# Patient Record
Sex: FEMALE | Race: WHITE | NOT HISPANIC OR LATINO | Employment: FULL TIME | URBAN - METROPOLITAN AREA
[De-identification: names, ages, dates, MRNs, and addresses within clinical notes are randomized per-mention and may not be internally consistent; named-entity substitution may affect disease eponyms.]

---

## 2018-03-29 ENCOUNTER — TRANSCRIBE ORDERS (OUTPATIENT)
Dept: OCCUPATIONAL THERAPY | Facility: CLINIC | Age: 48
End: 2018-03-29

## 2018-03-29 DIAGNOSIS — S66.911A MUSCLE STRAIN OF RIGHT WRIST, INITIAL ENCOUNTER: ICD-10-CM

## 2018-03-29 DIAGNOSIS — S46.811A STRAIN OF RIGHT DELTOID MUSCLE, INITIAL ENCOUNTER: Primary | ICD-10-CM

## 2018-04-03 ENCOUNTER — TREATMENT (OUTPATIENT)
Dept: PHYSICAL THERAPY | Facility: CLINIC | Age: 48
End: 2018-04-03

## 2018-04-03 DIAGNOSIS — S66.911D STRAIN OF RIGHT WRIST, SUBSEQUENT ENCOUNTER: ICD-10-CM

## 2018-04-03 DIAGNOSIS — S46.911D STRAIN OF RIGHT SHOULDER, SUBSEQUENT ENCOUNTER: Primary | ICD-10-CM

## 2018-04-03 PROCEDURE — 97140 MANUAL THERAPY 1/> REGIONS: CPT | Performed by: PHYSICAL THERAPIST

## 2018-04-03 PROCEDURE — 97110 THERAPEUTIC EXERCISES: CPT | Performed by: PHYSICAL THERAPIST

## 2018-04-03 PROCEDURE — 97001 PR PHYS THERAPY EVALUATION: CPT | Performed by: PHYSICAL THERAPIST

## 2018-04-03 PROCEDURE — 97035 APP MDLTY 1+ULTRASOUND EA 15: CPT | Performed by: PHYSICAL THERAPIST

## 2018-04-03 PROCEDURE — 97033 APP MDLTY 1+IONTPHRSIS EA 15: CPT | Performed by: PHYSICAL THERAPIST

## 2018-04-03 NOTE — PROGRESS NOTES
Physical Therapy Initial Evaluation and Plan of Care    Patient: Jaz Mora   : 1970  Diagnosis/ICD-10 Code:  Strain of right shoulder, subsequent encounter [S46.911D]  Referring practitioner: RAFFI Gonsales    Subjective Evaluation    History of Present Illness  Date of onset: 3/22/2018  Mechanism of injury: Slipped on ice in the parking lot, reached out the right arm to catch self and landed with all weight on right UE/on her belly -Immediate pain in the right arm, could not  Extend arm to reach for her phone  BW same day - xrayd wrist - negative - Ibu 800, wrist wrap, ice,   Return to BW - now on diclofenac    No specific outside activities       Patient Occupation:  [employer] - one month    Precautions and Work Restrictions: mo heavy lifting no over head workPain  Current pain ratin  At best pain ratin  At worst pain ratin  Location: abduction, biceps, triceps, wrist radial aspect distal radius, wrist feels weak , no sense of wrist popping, shifting  Quality: radiating, dull ache, sharp, tight, pulling and knife-like  Relieving factors: rest, medications, heat and change in position (arm to side)  Aggravating factors: repetitive movement, outstretched reach, overhead activity, lifting and sleeping  Progression: improved    Social Support  Lives with: spouse    Hand dominance: right    Diagnostic Tests  X-ray: normal (wrist)    Treatments  Previous treatment: medication and immobilization  Current treatment: medication and physical therapy  Patient Goals  Patient goal: Full ROM in arm and not pain           Objective       Postural Observations    Additional Postural Observation Details  Protraction of shoulders    Observations     Additional Observation Details  NO signs of swelling or bruising in hte arm    Palpation     Right Tenderness of the supraspinatus.     Tenderness     Right Shoulder  Tenderness in the bicipital groove.     Neurological Testing      Sensation     Shoulder     Right Shoulder   Intact: light touch    Comments   Right light touch: but does have NT at night due to preexisting CTS    Active Range of Motion     Right Shoulder   Flexion: 133 (painful arc ) degrees   Extension: 55 degrees   Abduction: 89 (pain at 55) degrees     Right Wrist   Wrist flexion: 60 degrees   Wrist extension: 60 degrees   Radial deviation: 28 degrees   Ulnar deviation: 40 degrees     Right Thumb   Opposition: Full motion but has pain with end range flexion and abduction    Additional Active Range of Motion Details  Able to make full fist with right hand    Passive Range of Motion     Right Shoulder   Flexion: 135 degrees   Abduction: 120 degrees   External rotation 45°: 85 degrees   Internal rotation 45°: 85 degrees     Strength/Myotome Testing     Right Shoulder     Planes of Motion   Flexion: 4   Extension: 4+   Abduction: 4-   External rotation at 0°: 4   Internal rotation at 0°: 4+     Left Wrist/Hand      (2nd hand position)     Trial 1: 35 lbs    Trial 2: 39 lbs    Thumb Strength  Key/Lateral Pinch     Trial 1: 13.2 lbs    Trial 2: 14 lbs    Right Wrist/Hand   Wrist extension: 4+  Wrist flexion: 4  Radial deviation: 4-  Ulnar deviation: 4+     (2nd hand position)     Trial 1: 32 lbs    Trial 2: 32 lbs    Thumb Strength   Key/Lateral Pinch     Trial 1: 10.8 lbs    Trial 2: 12.6 lbs    Additional Strength Details  significant pain with resisted right thumb extension and abduction    Tests     Right Shoulder   Positive empty can, Hawkin's, Neer's and Speed's.   Negative anterior load and shift, AC shear, clunk, crossover and drop arm.     Right Wrist/Hand   Positive Finkelstein's.   Negative distal radial-ulnar joint stress, RCL varus stress and ulnotriquetral shear.          Assessment & Plan     Assessment  Impairments: abnormal muscle firing, abnormal muscle tone, abnormal or restricted ROM, activity intolerance, impaired physical strength, lacks  appropriate home exercise program, pain with function and safety issue  Assessment details: 48 y.o. Female with right shoulder and hand wrist strain after fall on ice presents with: 1. Right radial wrist and thumb pain, 2. Right shoulder anterior and lateral pain, 3. Decreased shoulder AROM, 4. Positive Impingement signs in the shoulder, 5. Positive Finklesteins test on the thumb, 6. No signs of instability in the wrist or shoulder, 7. Decreased tolerance for many normal ADL's  Prognosis: good  Functional Limitations: carrying objects, lifting, pulling, pushing, uncomfortable because of pain, reaching behind back, reaching overhead and unable to perform repetitive tasks  Goals  Plan Goals: Short Term Goals: 1 week  Patient will be able to tolerate initial exercises  Patient will have pain <5/10  Patient will be able to sleep without pain interruption  Patient will be able to flex shoulder to >120 without pain    Long Term Goals: 4 weeks  Patient will be independent in performing home exercise program.  Patient will have functional pain free shoulder and hand AROM  Patient will be able to reach behind back to fully dress self without increased pain  Patient will return to work with min/no restrictions      Plan  Therapy options: will be seen for skilled physical therapy services  Planned modality interventions: iontophoresis and ultrasound  Planned therapy interventions: manual therapy, home exercise program, strengthening, stretching, soft tissue mobilization and joint mobilization  Frequency: 3x week  Duration in visits: 12  Duration in weeks: 4  Treatment plan discussed with: patient  Plan details: Discussed probably causes of symptoms with model, rehab process, HEP        Manual Therapy:    14     mins  33424;  Therapeutic Exercise:    15     mins  24380;     Neuromuscular Amber:    0    mins  79707;    Therapeutic Activity:     0     mins  80198;       Evaluation Time:     25  mins  Timed Treatment:   45   mins    Total Treatment:     75   mins    PT SIGNATURE: Giovana Bonner, PT   DATE TREATMENT INITIATED: 4/3/2018    Initial Certification  Certification Period: 7/2/2018  I certify that the therapy services are furnished while this patient is under my care.  The services outlined above are required by this patient, and will be reviewed every 90 days.     PHYSICIAN: Denise Garza, RAFFI ____________________      DATE: ______________    Please sign and return via fax to [phone].. Thank you, [facility].

## 2018-04-04 ENCOUNTER — TREATMENT (OUTPATIENT)
Dept: PHYSICAL THERAPY | Facility: CLINIC | Age: 48
End: 2018-04-04

## 2018-04-04 DIAGNOSIS — S66.911D STRAIN OF RIGHT WRIST, SUBSEQUENT ENCOUNTER: ICD-10-CM

## 2018-04-04 DIAGNOSIS — S46.911D STRAIN OF RIGHT SHOULDER, SUBSEQUENT ENCOUNTER: Primary | ICD-10-CM

## 2018-04-04 PROCEDURE — 97033 APP MDLTY 1+IONTPHRSIS EA 15: CPT | Performed by: PHYSICAL THERAPIST

## 2018-04-04 PROCEDURE — 97140 MANUAL THERAPY 1/> REGIONS: CPT | Performed by: PHYSICAL THERAPIST

## 2018-04-04 PROCEDURE — 97110 THERAPEUTIC EXERCISES: CPT | Performed by: PHYSICAL THERAPIST

## 2018-04-04 NOTE — PROGRESS NOTES
Physical Therapy Daily Progress Note    VISIT#: 2    Subjective   Jaz Mora reports: that she is more sore in the thumb and shoulder but does not have any additional sharp pain.  Reports compliance with her HEP.  States that she forgot her pain meds today.        Objective   Minimal tenderness in the thumb abductor tendon    Tenderness in the anterior/lateral SA space of the shoulder    Redness noted from the ionto adhesive    Passive flexion 150    See Exercise, Manual, and Modality Logs for complete treatment.     Patient Education:    Assessment/Plan  Shoulder passive motion has increased as has thumb active motion.  Much increased exercise tolerance     Progress per Plan of Care           Manual Therapy:    14     mins  31195;  Therapeutic Exercise:    25     mins  91679;     Neuromuscular Amber:    0    mins  83177;    Therapeutic Activity:     0     mins  14006;       Timed Treatment:   62   mins   Total Treatment:     70   mins    Giovana Bonner, PT  [place] License # [id number]  Physical Therapist

## 2018-04-05 ENCOUNTER — TREATMENT (OUTPATIENT)
Dept: PHYSICAL THERAPY | Facility: CLINIC | Age: 48
End: 2018-04-05

## 2018-04-05 DIAGNOSIS — S46.911D STRAIN OF RIGHT SHOULDER, SUBSEQUENT ENCOUNTER: Primary | ICD-10-CM

## 2018-04-05 DIAGNOSIS — S66.911D STRAIN OF RIGHT WRIST, SUBSEQUENT ENCOUNTER: ICD-10-CM

## 2018-04-05 PROCEDURE — 97110 THERAPEUTIC EXERCISES: CPT | Performed by: PHYSICAL THERAPIST

## 2018-04-05 PROCEDURE — 97140 MANUAL THERAPY 1/> REGIONS: CPT | Performed by: PHYSICAL THERAPIST

## 2018-04-05 PROCEDURE — 97033 APP MDLTY 1+IONTPHRSIS EA 15: CPT | Performed by: PHYSICAL THERAPIST

## 2018-04-05 PROCEDURE — 97035 APP MDLTY 1+ULTRASOUND EA 15: CPT | Performed by: PHYSICAL THERAPIST

## 2018-04-05 NOTE — PROGRESS NOTES
Physical Therapy Daily Progress Note    VISIT#: 3    Subjective   Jaz Mora reports: that her shoulder pain and thumb pain have both decreased considerably.  States that she still has pain with abduction but flexion feels much better.  States that she was able to open a door more easiy today without pain.      Objective   Active flexion 160 without pain, pain with 90* abduction     Tenderness in thumb extension    See Exercise, Manual, and Modality Logs for complete treatment.     Patient Education: cont HEP    Assessment/Plan  Much improved shoulder motion with less pain.  Less guarding noted with the motion as well.  Thumb irritation resolving.    Progress per Plan of Care  To MD after next visit         Manual Therapy:    17     mins  17651;  Therapeutic Exercise:    15     mins  64170;     Neuromuscular Amber:    0    mins  54520;    Therapeutic Activity:     0     mins  25719;       Timed Treatment:   48   mins   Total Treatment:     50   mins    Giovana Bonner, PT  [place] License # [id number]  Physical Therapist

## 2018-04-06 ENCOUNTER — TREATMENT (OUTPATIENT)
Dept: PHYSICAL THERAPY | Facility: CLINIC | Age: 48
End: 2018-04-06

## 2018-04-06 DIAGNOSIS — S66.911D STRAIN OF RIGHT WRIST, SUBSEQUENT ENCOUNTER: ICD-10-CM

## 2018-04-06 DIAGNOSIS — S46.911D STRAIN OF RIGHT SHOULDER, SUBSEQUENT ENCOUNTER: Primary | ICD-10-CM

## 2018-04-06 PROCEDURE — 97033 APP MDLTY 1+IONTPHRSIS EA 15: CPT | Performed by: PHYSICAL THERAPIST

## 2018-04-06 PROCEDURE — 97110 THERAPEUTIC EXERCISES: CPT | Performed by: PHYSICAL THERAPIST

## 2018-04-06 PROCEDURE — 97530 THERAPEUTIC ACTIVITIES: CPT | Performed by: PHYSICAL THERAPIST

## 2018-04-06 PROCEDURE — 97140 MANUAL THERAPY 1/> REGIONS: CPT | Performed by: PHYSICAL THERAPIST

## 2018-04-06 NOTE — PROGRESS NOTES
MD Letter    Date of Initial Visit: Type: THERAPY  Noted: 4/3/2018  Today's Date: 4/6/2018  Patient seen for 4 sessions    Treatment has included: therapeutic exercise, manual therapy, therapeutic activity, ultrasound and iontophoresis    Subjective   Jaz states that she is much improved from her status last week.  States that she has more mobility without pain but does still have pain with abduction of the shoulder.  She states that the wrist is 97% of normal as she still has some discomfort with end range radial deviation.  She states that the shoulder is 90% but is concerned about the pain with abduction.      Objective - she presents moving in fluid movement patterns  Wrist AROM - full in all planes of motion but has some discomfort with end range flexion and RD  Shoulder AROM - full in all planes of motion but has a painful arc from  in abudction   Strength - 5/5 in wrist, 5/5 in shoulder except for flexion and abduction which are slightly limited by pain   strength - right 40#, left 47#  Sensation - intact  Palpation - tenderness in the anterior subacromial space  Special tests - positive Empty Can, Speeds and Navarrete Zak Impingement tests, she has a slightly positive finklesteins sign  Activity tolerances - she is able to perform all of her normal activities at work without pain but is unable to reach overhead without pain.    Assessment/Plan  Patient has demonstrated moderate/signifcant improvement since the initiation of therapy.  The pain has  Decreased considerably.  The motion has increased to full but not fully pain free.  The activity tolerances have increased .  I feel that the patient would benefit from a further short course of therapy secondary to the persistent signs of impingement.  If you have any questions concerning the care, please do not hesitate to contact me.          PT Signature: Giovana Bonner, PT    Therapeutic Exercise - 20   Minutes  Therapeutic Activities - 10   Minutes  Assessed for MD  Neuromuscular Re-education - 0 Minutes  Manual Therapy - 15  Minutes      Direct Billed Treatment Time - 61  Minutes  Total Treatment Time - 70  Minutes

## 2018-04-10 ENCOUNTER — TREATMENT (OUTPATIENT)
Dept: PHYSICAL THERAPY | Facility: CLINIC | Age: 48
End: 2018-04-10

## 2018-04-10 DIAGNOSIS — S66.911D STRAIN OF RIGHT WRIST, SUBSEQUENT ENCOUNTER: ICD-10-CM

## 2018-04-10 DIAGNOSIS — S46.911D STRAIN OF RIGHT SHOULDER, SUBSEQUENT ENCOUNTER: Primary | ICD-10-CM

## 2018-04-10 PROCEDURE — 97035 APP MDLTY 1+ULTRASOUND EA 15: CPT | Performed by: PHYSICAL THERAPIST

## 2018-04-10 PROCEDURE — 97110 THERAPEUTIC EXERCISES: CPT | Performed by: PHYSICAL THERAPIST

## 2018-04-10 PROCEDURE — 97140 MANUAL THERAPY 1/> REGIONS: CPT | Performed by: PHYSICAL THERAPIST

## 2018-04-10 PROCEDURE — 97033 APP MDLTY 1+IONTPHRSIS EA 15: CPT | Performed by: PHYSICAL THERAPIST

## 2018-04-11 ENCOUNTER — TREATMENT (OUTPATIENT)
Dept: PHYSICAL THERAPY | Facility: CLINIC | Age: 48
End: 2018-04-11

## 2018-04-11 DIAGNOSIS — S46.911D STRAIN OF RIGHT SHOULDER, SUBSEQUENT ENCOUNTER: Primary | ICD-10-CM

## 2018-04-11 PROCEDURE — 97140 MANUAL THERAPY 1/> REGIONS: CPT | Performed by: PHYSICAL THERAPIST

## 2018-04-11 PROCEDURE — 97035 APP MDLTY 1+ULTRASOUND EA 15: CPT | Performed by: PHYSICAL THERAPIST

## 2018-04-11 PROCEDURE — 97033 APP MDLTY 1+IONTPHRSIS EA 15: CPT | Performed by: PHYSICAL THERAPIST

## 2018-04-11 PROCEDURE — 97110 THERAPEUTIC EXERCISES: CPT | Performed by: PHYSICAL THERAPIST

## 2018-04-11 NOTE — PROGRESS NOTES
Physical Therapy Daily Progress Note    VISIT#: 6    Subjective   Jaz Mora reports: that she feels better today as she was able to take her anti inflamms last night   States that she started her steroids  This morning.       Objective   Mild tenderness in thumb abductor tendon    Persistent skin irritation in the lateral shoulder from iionto patch    Pinch with end range flexion and mid range abduction, full ER    See Exercise, Manual, and Modality Logs for complete treatment.     Patient Education:  Put moisturizer on irritated skin    Assessment/Plan  Less pain noted today as patient was able to take her anti inflammatory medication.  Good technique with exercises requiring only few cues for proper technique.     Progress per Plan of Care           Manual Therapy:    18     mins  67859;  Therapeutic Exercise:    15/30     mins  66942;     Neuromuscular Amber:    0    mins  30363;    Therapeutic Activity:     0     mins  50563;       Timed Treatment:   48   mins   Total Treatment:     70   mins    Giovana Bonner, PT  [place] License # [id number]  Physical Therapist

## 2018-04-13 ENCOUNTER — TREATMENT (OUTPATIENT)
Dept: PHYSICAL THERAPY | Facility: CLINIC | Age: 48
End: 2018-04-13

## 2018-04-13 DIAGNOSIS — S66.911D STRAIN OF RIGHT WRIST, SUBSEQUENT ENCOUNTER: ICD-10-CM

## 2018-04-13 DIAGNOSIS — S46.911D STRAIN OF RIGHT SHOULDER, SUBSEQUENT ENCOUNTER: Primary | ICD-10-CM

## 2018-04-13 PROCEDURE — 97140 MANUAL THERAPY 1/> REGIONS: CPT | Performed by: PHYSICAL THERAPIST

## 2018-04-13 PROCEDURE — 97110 THERAPEUTIC EXERCISES: CPT | Performed by: PHYSICAL THERAPIST

## 2018-04-13 PROCEDURE — 97033 APP MDLTY 1+IONTPHRSIS EA 15: CPT | Performed by: PHYSICAL THERAPIST

## 2018-04-13 PROCEDURE — 97530 THERAPEUTIC ACTIVITIES: CPT | Performed by: PHYSICAL THERAPIST

## 2018-04-13 NOTE — PROGRESS NOTES
MD Letter    Date of Initial Visit: Type: THERAPY  Noted: 4/3/2018  Today's Date: 4/13/2018  Patient seen for 7 sessions    Treatment has included: therapeutic exercise, manual therapy, ultrasound and iontophoresis    Subjective   Jaz states that she still has a pinching in the lateral shoulder with abduction and end range external rotation.  She reports occasional popping in the shoulder but no pain with the pop.  States that the thumb and wrist are feeling fine and is able to keyboard at work.      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  She is still having some difficulty dressing secondary to shoulder pain.    Assessment/Plan  Patient has demonstrated moderate improvement since the initiation of therapy.  The pain has  Decreased in intensity and frequency.  The motion has increased .  The activity tolerances have increased but are not back to pre injury status.  I feel that the patient would benefit from a further short course of therapy.  If you have any questions concerning the care, please do not hesitate to contact me.          PT Signature: Giovana Bonner, PT        Manual Therapy:    13     mins  49544;  Therapeutic Exercise:    15     mins  98152;     Neuromuscular Amber:    0    mins  75804;    Therapeutic Activity:     10     mins  91593;   Assessed for MD    Timed Treatment:   54   mins   Total Treatment:     60   mins

## 2018-04-17 ENCOUNTER — TREATMENT (OUTPATIENT)
Dept: PHYSICAL THERAPY | Facility: CLINIC | Age: 48
End: 2018-04-17

## 2018-04-17 DIAGNOSIS — S46.911D STRAIN OF RIGHT SHOULDER, SUBSEQUENT ENCOUNTER: Primary | ICD-10-CM

## 2018-04-17 PROCEDURE — 97110 THERAPEUTIC EXERCISES: CPT | Performed by: PHYSICAL THERAPIST

## 2018-04-17 PROCEDURE — 97140 MANUAL THERAPY 1/> REGIONS: CPT | Performed by: PHYSICAL THERAPIST

## 2018-04-17 PROCEDURE — 97033 APP MDLTY 1+IONTPHRSIS EA 15: CPT | Performed by: PHYSICAL THERAPIST

## 2018-04-17 PROCEDURE — 97035 APP MDLTY 1+ULTRASOUND EA 15: CPT | Performed by: PHYSICAL THERAPIST

## 2018-04-17 NOTE — PROGRESS NOTES
Physical Therapy Daily Progress Note    VISIT#: 8    Subjective   Jaz Mora reports: that she still notices a pinch in the shoulder with abduction.  Wrist still does have some ache but no longer has any sharp pain.       Objective   Tenderness in thumb abductor tendon and lateral subacromial space, full wrist and thumb motion    Full ER and IR but has pain at end range flexion and mid range abduction    See Exercise, Manual, and Modality Logs for complete treatment.     Patient Education: cont HEP    Assessment/Plan  Patient resolving, but still symptomatic impingement syndrome in the right shoulder and irritation in thumb abductor tendon after fall.  Compliant with exercise at home. Now using hand more normally with minimal pain but early fatigue.    Progress per Plan of Care  To MD after next session, if impingement still present in shoulder may need ortho referral         Manual Therapy:    13     mins  55017;  Therapeutic Exercise:    15     mins  85811;     Neuromuscular Amber:    0    mins  28231;    Therapeutic Activity:     0     mins  10088;       Timed Treatment:   44   mins   Total Treatment:     60   mins    Giovana Bonner, PT  [place] License # [id number]  Physical Therapist

## 2018-04-19 ENCOUNTER — TREATMENT (OUTPATIENT)
Dept: PHYSICAL THERAPY | Facility: CLINIC | Age: 48
End: 2018-04-19

## 2018-04-19 DIAGNOSIS — S66.911D STRAIN OF RIGHT WRIST, SUBSEQUENT ENCOUNTER: ICD-10-CM

## 2018-04-19 DIAGNOSIS — S46.911D STRAIN OF RIGHT SHOULDER, SUBSEQUENT ENCOUNTER: Primary | ICD-10-CM

## 2018-04-19 PROCEDURE — 97110 THERAPEUTIC EXERCISES: CPT | Performed by: PHYSICAL THERAPIST

## 2018-04-19 PROCEDURE — 97140 MANUAL THERAPY 1/> REGIONS: CPT | Performed by: PHYSICAL THERAPIST

## 2018-04-19 PROCEDURE — 97530 THERAPEUTIC ACTIVITIES: CPT | Performed by: PHYSICAL THERAPIST

## 2018-04-19 NOTE — PROGRESS NOTES
------------------------------------------------------------------------------------------------------   MD PROGRESS NOTE      Patient: Jaz Mora        : 1970  Diagnosis/ICD-10 Code:  Strain of right shoulder, subsequent encounter [S46.911D]  Referring practitioner: RAFFI Gonsales  Date of Initial Visit: 2018                  Today's Date: 2018  _________________________________________________________________    Thank you for the referral of Ms. Mora to [facility].  Ms. Mora has attended 9 PT sessions and their treatment has consisted of: modalities prn, manual therapy, therapeutic exercise, patient education, and HEP.     Subjective   Jaz Mora reports: increased pain / ache (R) shoulder arm to finger tips - took alleve - better today - Did a lot of hand witting yesterday -   Now: Pain in biceps and radial wrist (R); Pain increased with lateral reaching, lifting and sleeping on (R) side;  Wrist pain with use / over use and at rest intermittently -  Overall: Pt reports improvement with decreasing pain and improved mobility and function -   ___________________________________________________________________  Objective              OBSERVATION: Rounded shoulder posture - No significant swelling / deformity noted               PALPATION: Tender at 1st DC tendons (R) wrist ; Tender at proximal LH Biceps (R) shoulder with palpable catch / crepitus anterior shoulder (R) /proximal LH Biceps region -         AROM: WFL all planes (R) shoulder with painful catch with FE and return ; Wrist AROM WFL with pain at 1st DC and FCR insertion at end-range wrist extension   STRENGTH: Strong Empty Can and ER/IR;  Pain deep sup GH Jt with resisted ABDuction;  Pain with resisted thumb extension (R)   SPECIAL TESTS: (-) Finklestein (R); (-) Empty Can; (+) Speeds; (-) Drop Arm; (-) [place];s;    ACTIVITY TOLERANCE: Limited tolerance to (R) wrist functional activities and  reaching / lifting activities (R) shoulder and sleeping    See Exercise, Manual, and Modality Logs for complete treatment.      Functional / Therapeutic Activities:  X 30 min  · TAPING / BRACING: K-Tape to 1) Unload 1st DC tendons; 2) Unload LH biceps and deltoid   · FUNCTIONAL ASSESSMENT    · Jt protection, ADL modification; Posture and    ___________________________________________________________________   Assessment/Plan  (R) shoulder strain; (R) Wrist strain / DeQuervains;   Persistent pain in (R) shoulder - may benefit from further assessment to rule out probable labral > RTC involvement -   Improving radial wrist pain (R) - Would benefit from continued Physical therapy for right wrist with addition of thumb spica brace to address DeQuervain's    P: Recommend further assessment to rule out probable labral > RTC involvement and continued Physical therapy for right wrist with addition of thumb spica brace to address DeQuervain's.     Please advise after your exam.    Thank you again for this referral of Ms. Mora to [facility].    PT Signature: ______________________________   Darwin Ramos, PT  ______________________________________________________  Based upon review of the patient's progress and continued therapy plan, it is my medical opinion that Jaz Mora should continue physical therapy treatment per the recommendation above.     Signature: ____________________________   Date:  _____   RAFFI Gonsales  ____________________________________________________________________  Manual Therapy:    15     mins  07059;    Therapeutic Exercise:   20    mins  15656;     Neuromuscular Amber:        mins  41256;     Therapeutic Activity:    25     mins  77319;     Ultrasound:     10     mins  94483;     Electrical Stimulation:       mins  29950 ( );  Dry Needling          mins self-pay     Gait Training:          mins  60519;    Timed Treatment:   70   mins              Total Treatment:     80   mins    ______________________________________________________________________  [id number] [facility]  [place] [address]  Phone: [phone] Fax: [phone]

## 2018-05-17 ENCOUNTER — TRANSCRIBE ORDERS (OUTPATIENT)
Dept: ADMINISTRATIVE | Facility: HOSPITAL | Age: 48
End: 2018-05-17

## 2018-05-17 DIAGNOSIS — M25.511 ACUTE PAIN OF RIGHT SHOULDER: Primary | ICD-10-CM

## 2018-05-17 DIAGNOSIS — M25.531 WRIST PAIN, ACUTE, RIGHT: ICD-10-CM

## 2018-06-04 ENCOUNTER — HOSPITAL ENCOUNTER (OUTPATIENT)
Dept: MRI IMAGING | Facility: HOSPITAL | Age: 48
Discharge: HOME | End: 2018-06-04
Attending: SPECIALIST

## 2018-06-04 ENCOUNTER — HOSPITAL ENCOUNTER (OUTPATIENT)
Dept: GENERAL RADIOLOGY | Facility: HOSPITAL | Age: 48
Discharge: HOME | End: 2018-06-04
Attending: SPECIALIST | Admitting: SPECIALIST

## 2018-06-04 DIAGNOSIS — M25.531 WRIST PAIN, ACUTE, RIGHT: ICD-10-CM

## 2018-06-04 PROCEDURE — 25010000002 IOPAMIDOL 61 % SOLUTION: Performed by: RADIOLOGY

## 2018-06-04 PROCEDURE — 73222 MRI JOINT UPR EXTREM W/DYE: CPT

## 2018-06-04 PROCEDURE — 0 GADOBENATE DIMEGLUMINE 529 MG/ML SOLUTION: Performed by: RADIOLOGY

## 2018-06-04 PROCEDURE — 77002 NEEDLE LOCALIZATION BY XRAY: CPT

## 2018-06-04 PROCEDURE — A9577 INJ MULTIHANCE: HCPCS | Performed by: RADIOLOGY

## 2018-06-04 RX ORDER — LIDOCAINE HYDROCHLORIDE 10 MG/ML
10 INJECTION, SOLUTION INFILTRATION; PERINEURAL ONCE
Status: COMPLETED | OUTPATIENT
Start: 2018-06-04 | End: 2018-06-04

## 2018-06-04 RX ADMIN — LIDOCAINE HYDROCHLORIDE 1 ML: 10 INJECTION, SOLUTION INFILTRATION; PERINEURAL at 11:12

## 2018-06-04 RX ADMIN — IOPAMIDOL 1 ML: 612 INJECTION, SOLUTION INTRAVENOUS at 11:13

## 2018-06-04 RX ADMIN — GADOBENATE DIMEGLUMINE 2 ML: 529 INJECTION, SOLUTION INTRAVENOUS at 11:13

## 2018-06-11 ENCOUNTER — HOSPITAL ENCOUNTER (OUTPATIENT)
Dept: MRI IMAGING | Facility: HOSPITAL | Age: 48
Discharge: HOME | End: 2018-06-11
Attending: SPECIALIST

## 2018-06-11 ENCOUNTER — HOSPITAL ENCOUNTER (OUTPATIENT)
Dept: GENERAL RADIOLOGY | Facility: HOSPITAL | Age: 48
Discharge: HOME | End: 2018-06-11
Attending: SPECIALIST

## 2018-06-11 DIAGNOSIS — M25.511 ACUTE PAIN OF RIGHT SHOULDER: ICD-10-CM

## 2018-06-11 PROCEDURE — 77002 NEEDLE LOCALIZATION BY XRAY: CPT

## 2018-06-11 PROCEDURE — 73222 MRI JOINT UPR EXTREM W/DYE: CPT

## 2018-06-11 PROCEDURE — A9577 INJ MULTIHANCE: HCPCS | Performed by: SPECIALIST

## 2018-06-11 PROCEDURE — 0 GADOBENATE DIMEGLUMINE 529 MG/ML SOLUTION: Performed by: SPECIALIST

## 2018-06-11 PROCEDURE — 25010000002 IOPAMIDOL 61 % SOLUTION: Performed by: SPECIALIST

## 2018-06-11 RX ORDER — LIDOCAINE HYDROCHLORIDE 10 MG/ML
10 INJECTION, SOLUTION INFILTRATION; PERINEURAL ONCE
Status: COMPLETED | OUTPATIENT
Start: 2018-06-11 | End: 2018-06-11

## 2018-06-11 RX ADMIN — GADOBENATE DIMEGLUMINE 0.05 ML: 529 INJECTION, SOLUTION INTRAVENOUS at 11:05

## 2018-06-11 RX ADMIN — IOPAMIDOL 1 ML: 612 INJECTION, SOLUTION INTRAVENOUS at 11:04

## 2018-06-11 RX ADMIN — LIDOCAINE HYDROCHLORIDE 3 ML: 10 INJECTION, SOLUTION INFILTRATION; PERINEURAL at 11:02

## 2018-06-21 ENCOUNTER — HOSPITAL ENCOUNTER (OUTPATIENT)
Dept: CARDIOLOGY | Facility: HOSPITAL | Age: 48
Discharge: HOME | End: 2018-06-21
Attending: SPECIALIST | Admitting: SPECIALIST

## 2018-06-21 ENCOUNTER — LAB (OUTPATIENT)
Dept: LAB | Facility: HOSPITAL | Age: 48
End: 2018-06-21
Attending: SPECIALIST

## 2018-06-21 ENCOUNTER — TRANSCRIBE ORDERS (OUTPATIENT)
Dept: LAB | Facility: HOSPITAL | Age: 48
End: 2018-06-21

## 2018-06-21 DIAGNOSIS — I10 ESSENTIAL HYPERTENSION, MALIGNANT: ICD-10-CM

## 2018-06-21 DIAGNOSIS — I10 ESSENTIAL HYPERTENSION, MALIGNANT: Primary | ICD-10-CM

## 2018-06-21 LAB
ANION GAP SERPL CALCULATED.3IONS-SCNC: 14.7 MMOL/L
BUN BLD-MCNC: 13 MG/DL (ref 6–20)
BUN/CREAT SERPL: 13.5 (ref 7–25)
CALCIUM SPEC-SCNC: 9.3 MG/DL (ref 8.6–10.5)
CHLORIDE SERPL-SCNC: 102 MMOL/L (ref 98–107)
CO2 SERPL-SCNC: 26.3 MMOL/L (ref 22–29)
CREAT BLD-MCNC: 0.96 MG/DL (ref 0.57–1)
DEPRECATED RDW RBC AUTO: 44.2 FL (ref 37–54)
ERYTHROCYTE [DISTWIDTH] IN BLOOD BY AUTOMATED COUNT: 13.3 % (ref 11.7–13)
GFR SERPL CREATININE-BSD FRML MDRD: 62 ML/MIN/1.73
GLUCOSE BLD-MCNC: 97 MG/DL (ref 65–99)
HCT VFR BLD AUTO: 46.5 % (ref 35.6–45.5)
HGB BLD-MCNC: 15.2 G/DL (ref 11.9–15.5)
MCH RBC QN AUTO: 29.8 PG (ref 26.9–32)
MCHC RBC AUTO-ENTMCNC: 32.7 G/DL (ref 32.4–36.3)
MCV RBC AUTO: 91.2 FL (ref 80.5–98.2)
PLATELET # BLD AUTO: 272 10*3/MM3 (ref 140–500)
PMV BLD AUTO: 10.4 FL (ref 6–12)
POTASSIUM BLD-SCNC: 3.9 MMOL/L (ref 3.5–5.2)
RBC # BLD AUTO: 5.1 10*6/MM3 (ref 3.9–5.2)
SODIUM BLD-SCNC: 143 MMOL/L (ref 136–145)
WBC NRBC COR # BLD: 9.82 10*3/MM3 (ref 4.5–10.7)

## 2018-06-21 PROCEDURE — 36415 COLL VENOUS BLD VENIPUNCTURE: CPT

## 2018-06-21 PROCEDURE — 85027 COMPLETE CBC AUTOMATED: CPT

## 2018-06-21 PROCEDURE — 93010 ELECTROCARDIOGRAM REPORT: CPT | Performed by: INTERNAL MEDICINE

## 2018-06-21 PROCEDURE — 80048 BASIC METABOLIC PNL TOTAL CA: CPT

## 2018-06-21 PROCEDURE — 93005 ELECTROCARDIOGRAM TRACING: CPT | Performed by: SPECIALIST

## 2018-06-25 ENCOUNTER — APPOINTMENT (OUTPATIENT)
Dept: GENERAL RADIOLOGY | Facility: HOSPITAL | Age: 48
End: 2018-06-25

## 2018-06-25 ENCOUNTER — HOSPITAL ENCOUNTER (INPATIENT)
Facility: HOSPITAL | Age: 48
LOS: 2 days | Discharge: HOME | End: 2018-06-27
Attending: INTERNAL MEDICINE | Admitting: HOSPITALIST

## 2018-06-25 PROBLEM — M75.101 RIGHT ROTATOR CUFF TEAR: Status: ACTIVE | Noted: 2018-06-25

## 2018-06-25 PROBLEM — Z98.890 POSTOPERATIVE HYPOXIA: Status: ACTIVE | Noted: 2018-06-25

## 2018-06-25 PROBLEM — R11.0 NAUSEA AFTER ANESTHESIA: Status: ACTIVE | Noted: 2018-06-25

## 2018-06-25 PROBLEM — R51.9 HEADACHE: Status: ACTIVE | Noted: 2018-06-25

## 2018-06-25 PROBLEM — T88.59XA NAUSEA AFTER ANESTHESIA: Status: ACTIVE | Noted: 2018-06-25

## 2018-06-25 PROBLEM — I10 ESSENTIAL HYPERTENSION: Status: ACTIVE | Noted: 2018-06-25

## 2018-06-25 PROBLEM — R09.02 POSTOPERATIVE HYPOXIA: Status: ACTIVE | Noted: 2018-06-25

## 2018-06-25 LAB
BASOPHILS # BLD AUTO: 0.01 10*3/MM3 (ref 0–0.2)
BASOPHILS NFR BLD AUTO: 0.1 % (ref 0–1.5)
DEPRECATED RDW RBC AUTO: 43.8 FL (ref 37–54)
EOSINOPHIL # BLD AUTO: 0 10*3/MM3 (ref 0–0.7)
EOSINOPHIL NFR BLD AUTO: 0 % (ref 0.3–6.2)
ERYTHROCYTE [DISTWIDTH] IN BLOOD BY AUTOMATED COUNT: 13.1 % (ref 11.7–13)
HCT VFR BLD AUTO: 46.1 % (ref 35.6–45.5)
HGB BLD-MCNC: 14.7 G/DL (ref 11.9–15.5)
IMM GRANULOCYTES # BLD: 0.02 10*3/MM3 (ref 0–0.03)
IMM GRANULOCYTES NFR BLD: 0.2 % (ref 0–0.5)
LYMPHOCYTES # BLD AUTO: 1.33 10*3/MM3 (ref 0.9–4.8)
LYMPHOCYTES NFR BLD AUTO: 10.7 % (ref 19.6–45.3)
MCH RBC QN AUTO: 29.2 PG (ref 26.9–32)
MCHC RBC AUTO-ENTMCNC: 31.9 G/DL (ref 32.4–36.3)
MCV RBC AUTO: 91.7 FL (ref 80.5–98.2)
MONOCYTES # BLD AUTO: 0.18 10*3/MM3 (ref 0.2–1.2)
MONOCYTES NFR BLD AUTO: 1.5 % (ref 5–12)
NEUTROPHILS # BLD AUTO: 10.86 10*3/MM3 (ref 1.9–8.1)
NEUTROPHILS NFR BLD AUTO: 87.5 % (ref 42.7–76)
PLATELET # BLD AUTO: 252 10*3/MM3 (ref 140–500)
PMV BLD AUTO: 10.1 FL (ref 6–12)
RBC # BLD AUTO: 5.03 10*6/MM3 (ref 3.9–5.2)
WBC NRBC COR # BLD: 12.4 10*3/MM3 (ref 4.5–10.7)

## 2018-06-25 PROCEDURE — 80053 COMPREHEN METABOLIC PANEL: CPT | Performed by: INTERNAL MEDICINE

## 2018-06-25 PROCEDURE — 85025 COMPLETE CBC W/AUTO DIFF WBC: CPT | Performed by: INTERNAL MEDICINE

## 2018-06-25 PROCEDURE — 25010000002 ONDANSETRON PER 1 MG: Performed by: INTERNAL MEDICINE

## 2018-06-25 PROCEDURE — 93005 ELECTROCARDIOGRAM TRACING: CPT | Performed by: INTERNAL MEDICINE

## 2018-06-25 PROCEDURE — 25010000002 HYDROMORPHONE PER 4 MG: Performed by: INTERNAL MEDICINE

## 2018-06-25 PROCEDURE — 71046 X-RAY EXAM CHEST 2 VIEWS: CPT

## 2018-06-25 PROCEDURE — 93010 ELECTROCARDIOGRAM REPORT: CPT | Performed by: INTERNAL MEDICINE

## 2018-06-25 PROCEDURE — G0378 HOSPITAL OBSERVATION PER HR: HCPCS

## 2018-06-25 RX ORDER — NALOXONE HCL 0.4 MG/ML
0.4 VIAL (ML) INJECTION
Status: DISCONTINUED | OUTPATIENT
Start: 2018-06-25 | End: 2018-06-27 | Stop reason: HOSPADM

## 2018-06-25 RX ORDER — SODIUM CHLORIDE 0.9 % (FLUSH) 0.9 %
1-10 SYRINGE (ML) INJECTION AS NEEDED
Status: DISCONTINUED | OUTPATIENT
Start: 2018-06-25 | End: 2018-06-27 | Stop reason: HOSPADM

## 2018-06-25 RX ORDER — ONDANSETRON 4 MG/1
4 TABLET, ORALLY DISINTEGRATING ORAL EVERY 6 HOURS PRN
Status: DISCONTINUED | OUTPATIENT
Start: 2018-06-25 | End: 2018-06-27 | Stop reason: HOSPADM

## 2018-06-25 RX ORDER — ONDANSETRON 4 MG/1
4 TABLET, FILM COATED ORAL EVERY 6 HOURS PRN
Status: DISCONTINUED | OUTPATIENT
Start: 2018-06-25 | End: 2018-06-27 | Stop reason: HOSPADM

## 2018-06-25 RX ORDER — ONDANSETRON 2 MG/ML
4 INJECTION INTRAMUSCULAR; INTRAVENOUS EVERY 6 HOURS PRN
Status: DISCONTINUED | OUTPATIENT
Start: 2018-06-25 | End: 2018-06-27 | Stop reason: HOSPADM

## 2018-06-25 RX ORDER — HYDROMORPHONE HYDROCHLORIDE 1 MG/ML
0.5 INJECTION, SOLUTION INTRAMUSCULAR; INTRAVENOUS; SUBCUTANEOUS
Status: DISCONTINUED | OUTPATIENT
Start: 2018-06-25 | End: 2018-06-27 | Stop reason: HOSPADM

## 2018-06-25 RX ORDER — ACETAMINOPHEN 325 MG/1
650 TABLET ORAL EVERY 4 HOURS PRN
Status: DISCONTINUED | OUTPATIENT
Start: 2018-06-25 | End: 2018-06-27 | Stop reason: HOSPADM

## 2018-06-25 RX ORDER — HYDROCODONE BITARTRATE AND ACETAMINOPHEN 7.5; 325 MG/1; MG/1
1 TABLET ORAL EVERY 4 HOURS PRN
Status: DISCONTINUED | OUTPATIENT
Start: 2018-06-25 | End: 2018-06-27 | Stop reason: HOSPADM

## 2018-06-25 RX ADMIN — ONDANSETRON 4 MG: 2 INJECTION INTRAMUSCULAR; INTRAVENOUS at 23:57

## 2018-06-25 RX ADMIN — HYDROMORPHONE HYDROCHLORIDE 0.5 MG: 1 INJECTION, SOLUTION INTRAMUSCULAR; INTRAVENOUS; SUBCUTANEOUS at 23:57

## 2018-06-26 PROBLEM — J98.11 ATELECTASIS: Status: ACTIVE | Noted: 2018-06-26

## 2018-06-26 PROBLEM — D72.829 LEUKOCYTOSIS: Status: ACTIVE | Noted: 2018-06-26

## 2018-06-26 LAB
ALBUMIN SERPL-MCNC: 4.1 G/DL (ref 3.5–5.2)
ALBUMIN/GLOB SERPL: 1.2 G/DL
ALP SERPL-CCNC: 51 U/L (ref 39–117)
ALT SERPL W P-5'-P-CCNC: 16 U/L (ref 1–33)
ANION GAP SERPL CALCULATED.3IONS-SCNC: 13.3 MMOL/L
AST SERPL-CCNC: 16 U/L (ref 1–32)
BILIRUB SERPL-MCNC: 0.4 MG/DL (ref 0.1–1.2)
BUN BLD-MCNC: 16 MG/DL (ref 6–20)
BUN/CREAT SERPL: 15 (ref 7–25)
CALCIUM SPEC-SCNC: 9.2 MG/DL (ref 8.6–10.5)
CHLORIDE SERPL-SCNC: 98 MMOL/L (ref 98–107)
CO2 SERPL-SCNC: 23.7 MMOL/L (ref 22–29)
CREAT BLD-MCNC: 1.07 MG/DL (ref 0.57–1)
GFR SERPL CREATININE-BSD FRML MDRD: 55 ML/MIN/1.73
GLOBULIN UR ELPH-MCNC: 3.3 GM/DL
GLUCOSE BLD-MCNC: 136 MG/DL (ref 65–99)
POTASSIUM BLD-SCNC: 4.3 MMOL/L (ref 3.5–5.2)
PROT SERPL-MCNC: 7.4 G/DL (ref 6–8.5)
SODIUM BLD-SCNC: 135 MMOL/L (ref 136–145)

## 2018-06-26 PROCEDURE — G0378 HOSPITAL OBSERVATION PER HR: HCPCS

## 2018-06-26 RX ORDER — HYDROCODONE BITARTRATE AND ACETAMINOPHEN 10; 325 MG/1; MG/1
1 TABLET ORAL EVERY 4 HOURS PRN
Status: DISCONTINUED | OUTPATIENT
Start: 2018-06-26 | End: 2018-06-27 | Stop reason: HOSPADM

## 2018-06-26 RX ADMIN — HYDROCODONE BITARTRATE AND ACETAMINOPHEN 1 TABLET: 10; 325 TABLET ORAL at 14:13

## 2018-06-26 RX ADMIN — HYDROCODONE BITARTRATE AND ACETAMINOPHEN 1 TABLET: 7.5; 325 TABLET ORAL at 12:06

## 2018-06-26 RX ADMIN — HYDROCODONE BITARTRATE AND ACETAMINOPHEN 1 TABLET: 7.5; 325 TABLET ORAL at 06:36

## 2018-06-26 RX ADMIN — ONDANSETRON 4 MG: 4 TABLET, FILM COATED ORAL at 19:45

## 2018-06-26 RX ADMIN — HYDROCODONE BITARTRATE AND ACETAMINOPHEN 1 TABLET: 10; 325 TABLET ORAL at 21:45

## 2018-06-26 RX ADMIN — HYDROCODONE BITARTRATE AND ACETAMINOPHEN 1 TABLET: 10; 325 TABLET ORAL at 17:45

## 2018-06-26 RX ADMIN — HYDROCODONE BITARTRATE AND ACETAMINOPHEN 1 TABLET: 7.5; 325 TABLET ORAL at 02:14

## 2018-06-27 VITALS
HEIGHT: 60 IN | HEART RATE: 75 BPM | BODY MASS INDEX: 41.16 KG/M2 | SYSTOLIC BLOOD PRESSURE: 118 MMHG | TEMPERATURE: 97.3 F | WEIGHT: 209.66 LBS | OXYGEN SATURATION: 90 % | RESPIRATION RATE: 16 BRPM | DIASTOLIC BLOOD PRESSURE: 60 MMHG

## 2018-06-27 PROBLEM — T81.9XXA POST-OPERATIVE COMPLICATION: Status: ACTIVE | Noted: 2018-06-27

## 2018-06-27 PROCEDURE — 94799 UNLISTED PULMONARY SVC/PX: CPT

## 2018-06-27 RX ADMIN — HYDROCODONE BITARTRATE AND ACETAMINOPHEN 1 TABLET: 10; 325 TABLET ORAL at 14:34

## 2018-06-27 RX ADMIN — ONDANSETRON 4 MG: 4 TABLET, FILM COATED ORAL at 07:52

## 2018-06-27 RX ADMIN — HYDROCODONE BITARTRATE AND ACETAMINOPHEN 1 TABLET: 10; 325 TABLET ORAL at 10:03

## 2018-06-27 RX ADMIN — ONDANSETRON 4 MG: 4 TABLET, FILM COATED ORAL at 12:53

## 2018-06-27 RX ADMIN — HYDROCODONE BITARTRATE AND ACETAMINOPHEN 1 TABLET: 10; 325 TABLET ORAL at 02:17

## 2018-06-27 RX ADMIN — HYDROCODONE BITARTRATE AND ACETAMINOPHEN 1 TABLET: 10; 325 TABLET ORAL at 06:08

## 2019-07-30 ENCOUNTER — TELEPHONE (OUTPATIENT)
Dept: FAMILY MEDICINE CLINIC | Facility: CLINIC | Age: 49
End: 2019-07-30

## 2019-07-30 DIAGNOSIS — I10 ESSENTIAL HYPERTENSION: Primary | ICD-10-CM

## 2019-07-30 RX ORDER — AMLODIPINE BESYLATE 5 MG/1
5 TABLET ORAL DAILY
Qty: 30 TABLET | Refills: 1 | Status: SHIPPED | OUTPATIENT
Start: 2019-07-30 | End: 2019-08-14 | Stop reason: SDUPTHER

## 2019-07-30 NOTE — TELEPHONE ENCOUNTER
Patient called office with c/o aching in bilateral groins since has been taking Losartan; pt LOV 6/21/19 had started having some fatigue after being on Losartan for about 3 weeks; pt had not been taking Vitamin D and Vitamin B12 daily at that time, so resumed; pt continued to have fatigue and pain in her legs at night with aching in her groins; pt tried heat and hot baths and did not help; pt avoids NSAIDs due to HTN; pt only new medication was the Losartan, so pt stopped taking x4 days last week and symptoms resolved; pt resumed Losartan few days ago and aching has started to return; pt has tried Lisinopril and HCTZ in the past and had side effects; instructed to stop Losartan; will send Rx for Amlodipine; discussed possible SE with medication; instructed to continue to monitor her BP and schedule a follow up appointment in 2 weeks, or sooner if problems or concerns; pt verbalized understanding.

## 2019-08-14 ENCOUNTER — OFFICE VISIT (OUTPATIENT)
Dept: FAMILY MEDICINE CLINIC | Facility: CLINIC | Age: 49
End: 2019-08-14

## 2019-08-14 VITALS
SYSTOLIC BLOOD PRESSURE: 112 MMHG | OXYGEN SATURATION: 98 % | DIASTOLIC BLOOD PRESSURE: 84 MMHG | TEMPERATURE: 98.8 F | HEART RATE: 92 BPM | HEIGHT: 60 IN | BODY MASS INDEX: 44.1 KG/M2 | WEIGHT: 224.6 LBS

## 2019-08-14 DIAGNOSIS — E55.9 VITAMIN D DEFICIENCY: ICD-10-CM

## 2019-08-14 DIAGNOSIS — I10 ESSENTIAL HYPERTENSION: Primary | ICD-10-CM

## 2019-08-14 DIAGNOSIS — E53.8 VITAMIN B12 DEFICIENCY: ICD-10-CM

## 2019-08-14 DIAGNOSIS — E04.1 THYROID NODULE: ICD-10-CM

## 2019-08-14 PROBLEM — Z98.890 POSTOPERATIVE HYPOXIA: Status: RESOLVED | Noted: 2018-06-25 | Resolved: 2019-08-14

## 2019-08-14 PROBLEM — R09.02 POSTOPERATIVE HYPOXIA: Status: RESOLVED | Noted: 2018-06-25 | Resolved: 2019-08-14

## 2019-08-14 PROCEDURE — 99213 OFFICE O/P EST LOW 20 MIN: CPT | Performed by: NURSE PRACTITIONER

## 2019-08-14 RX ORDER — ERGOCALCIFEROL 1.25 MG/1
50000 CAPSULE ORAL WEEKLY
Qty: 13 CAPSULE | Refills: 1 | Status: SHIPPED | OUTPATIENT
Start: 2019-08-14 | End: 2020-03-18 | Stop reason: SDUPTHER

## 2019-08-14 RX ORDER — AMLODIPINE BESYLATE 5 MG/1
5 TABLET ORAL DAILY
Qty: 90 TABLET | Refills: 1 | Status: SHIPPED | OUTPATIENT
Start: 2019-08-14 | End: 2020-03-18 | Stop reason: SDUPTHER

## 2019-08-14 RX ORDER — ERGOCALCIFEROL 1.25 MG/1
50000 CAPSULE ORAL WEEKLY
COMMUNITY
Start: 2017-07-24 | End: 2019-08-14 | Stop reason: SDUPTHER

## 2019-08-14 NOTE — ASSESSMENT & PLAN NOTE
Hypertension is improving with treatment.  Regular aerobic exercise.  Continue current medications.  Blood pressure will be reassessed at the next regular appointment.

## 2019-08-14 NOTE — PROGRESS NOTES
Subjective   Jaz Mora is a 49 y.o. female.     Chief Complaint   Patient presents with   • Hypertension     lab work    • Foot Swelling       History of Present Illness   Patient presents for follow-up for HTN: takes Amlodipine daily; has been compliant with medications; had problems with Lisinopril, Losartan, and HCTZ in past;  monitors BP, typically runs about 120s/80s; no headaches; no orthostasis; mild swelling in feet since has been on medication; has been trying to exercise more, up and down all day at work; drinks plenty of water.    F/U Vitamin D deficiency: resumed Vitamin D weekly.    F/U Vitamin B12 deficiency: resumed OTC Vitamin B12 daily.    F/U thyroid nodule: due to repeat US at this time, needs order; history of hypothyroidism in past; no medication at this time; last labs normal.    The following portions of the patient's history were reviewed and updated as appropriate: allergies, current medications, past family history, past medical history, past social history, past surgical history and problem list.    Past Medical History:   Diagnosis Date   • Carpal tunnel syndrome on both sides 10 years    wears braces at night   • Disease of thyroid gland    • GERD (gastroesophageal reflux disease)    • Hypertension    • Thyroid nodule    • Vitamin B12 deficiency    • Vitamin D deficiency        Past Surgical History:   Procedure Laterality Date   • BREAST SURGERY  1994    reduction   • ENDOMETRIAL ABLATION     • TONSILLECTOMY         Family History   Problem Relation Age of Onset   • Rheum arthritis Mother    • COPD Mother    • Heart failure Mother    • Diabetes Maternal Grandmother        Social History     Socioeconomic History   • Marital status:      Spouse name: Not on file   • Number of children: Not on file   • Years of education: Not on file   • Highest education level: Not on file   Tobacco Use   • Smoking status: Never Smoker   • Smokeless tobacco: Never Used   Substance and Sexual  "Activity   • Alcohol use: Yes     Alcohol/week: 0.6 oz     Types: 1 Cans of beer per week     Comment: socially   • Drug use: No   • Sexual activity: Defer       Review of Systems   Constitutional: Negative for appetite change, fatigue, unexpected weight gain and unexpected weight loss.   Respiratory: Negative for cough, chest tightness and shortness of breath.    Cardiovascular: Negative for chest pain.   Gastrointestinal: Negative for abdominal pain, constipation, diarrhea and GERD.   Musculoskeletal: Negative for arthralgias.   Neurological: Negative for dizziness and headache.       Objective   Vitals:    08/14/19 1559   BP: 112/84   BP Location: Right arm   Patient Position: Sitting   Cuff Size: Adult   Pulse: 92   Temp: 98.8 °F (37.1 °C)   SpO2: 98%   Weight: 102 kg (224 lb 9.6 oz)   Height: 152.4 cm (60\")   PainSc: 0-No pain     Body mass index is 43.86 kg/m².    Physical Exam   Constitutional: She is oriented to person, place, and time. She appears well-developed and well-nourished. No distress.   HENT:   Head: Normocephalic.   Eyes: Conjunctivae are normal.   Neck: Normal range of motion. Neck supple. Carotid bruit is not present.   Cardiovascular: Normal rate, regular rhythm, normal heart sounds and intact distal pulses.   No murmur heard.  Pulmonary/Chest: Effort normal and breath sounds normal. No respiratory distress.   Abdominal: Soft. Bowel sounds are normal. There is no hepatosplenomegaly. There is no tenderness.   Musculoskeletal: She exhibits no edema.   Neurological: She is alert and oriented to person, place, and time.   Skin: Skin is warm and dry. No rash noted.   Psychiatric: She has a normal mood and affect. Judgment normal. Cognition and memory are normal.   Vitals reviewed.        Assessment/Plan .  Problem List Items Addressed This Visit     Essential hypertension - Primary    Current Assessment & Plan     Hypertension is improving with treatment.  Regular aerobic exercise.  Continue " current medications.  Blood pressure will be reassessed at the next regular appointment.         Relevant Medications    amLODIPine (NORVASC) 5 MG tablet    Other Relevant Orders    Basic Metabolic Panel    Vitamin B12 deficiency    Current Assessment & Plan     Continue over the counter Vitamin B12.  Try taking every other day.         Vitamin D deficiency    Relevant Medications    vitamin D (ERGOCALCIFEROL) 13179 units capsule capsule    Thyroid nodule    Relevant Orders    US Thyroid          Return in about 6 months (around 2/14/2020) for Annual physical.

## 2019-08-14 NOTE — PATIENT INSTRUCTIONS
Continue to monitor your blood pressure periodically and record results.  Continue to work on healthy diet and exercise.  Follow up pending lab results.  Follow up in 6 months for physical with fasting labs, or sooner if problems or concerns.

## 2019-08-15 LAB
BUN SERPL-MCNC: 16 MG/DL (ref 6–24)
BUN/CREAT SERPL: 15 (ref 9–23)
CALCIUM SERPL-MCNC: 9.5 MG/DL (ref 8.7–10.2)
CHLORIDE SERPL-SCNC: 105 MMOL/L (ref 96–106)
CO2 SERPL-SCNC: 22 MMOL/L (ref 20–29)
CREAT SERPL-MCNC: 1.08 MG/DL (ref 0.57–1)
GLUCOSE SERPL-MCNC: 89 MG/DL (ref 65–99)
POTASSIUM SERPL-SCNC: 4.2 MMOL/L (ref 3.5–5.2)
SODIUM SERPL-SCNC: 143 MMOL/L (ref 134–144)

## 2019-09-25 ENCOUNTER — APPOINTMENT (OUTPATIENT)
Dept: ULTRASOUND IMAGING | Facility: HOSPITAL | Age: 49
End: 2019-09-25

## 2019-10-21 ENCOUNTER — HOSPITAL ENCOUNTER (OUTPATIENT)
Dept: ULTRASOUND IMAGING | Facility: HOSPITAL | Age: 49
Discharge: HOME | End: 2019-10-21
Admitting: NURSE PRACTITIONER

## 2019-10-21 DIAGNOSIS — E04.1 THYROID NODULE: ICD-10-CM

## 2019-10-21 PROCEDURE — 76536 US EXAM OF HEAD AND NECK: CPT

## 2020-03-18 ENCOUNTER — TELEPHONE (OUTPATIENT)
Dept: FAMILY MEDICINE CLINIC | Facility: CLINIC | Age: 50
End: 2020-03-18

## 2020-03-18 DIAGNOSIS — I10 ESSENTIAL HYPERTENSION: ICD-10-CM

## 2020-03-18 DIAGNOSIS — E55.9 VITAMIN D DEFICIENCY: ICD-10-CM

## 2020-03-18 RX ORDER — ERGOCALCIFEROL 1.25 MG/1
50000 CAPSULE ORAL WEEKLY
Qty: 13 CAPSULE | Refills: 0 | Status: SHIPPED | OUTPATIENT
Start: 2020-03-18 | End: 2021-01-04

## 2020-03-18 RX ORDER — AMLODIPINE BESYLATE 5 MG/1
5 TABLET ORAL DAILY
Qty: 90 TABLET | Refills: 0 | Status: SHIPPED | OUTPATIENT
Start: 2020-03-18 | End: 2021-01-04

## 2020-03-18 NOTE — TELEPHONE ENCOUNTER
Patient is requesting a refill on her vitamin D and her amlodipine 5 mg tablets please. Pharmacy is [facility].  Her phone number is [phone].

## 2020-12-16 ENCOUNTER — APPOINTMENT (OUTPATIENT)
Dept: WOMENS IMAGING | Facility: HOSPITAL | Age: 50
End: 2020-12-16

## 2020-12-16 PROCEDURE — 77063 BREAST TOMOSYNTHESIS BI: CPT | Performed by: RADIOLOGY

## 2020-12-16 PROCEDURE — 77067 SCR MAMMO BI INCL CAD: CPT | Performed by: RADIOLOGY

## 2021-01-04 ENCOUNTER — OFFICE VISIT (OUTPATIENT)
Dept: FAMILY MEDICINE CLINIC | Facility: CLINIC | Age: 51
End: 2021-01-04

## 2021-01-04 VITALS
OXYGEN SATURATION: 97 % | SYSTOLIC BLOOD PRESSURE: 118 MMHG | TEMPERATURE: 98.4 F | HEART RATE: 73 BPM | BODY MASS INDEX: 32.94 KG/M2 | HEIGHT: 60 IN | DIASTOLIC BLOOD PRESSURE: 86 MMHG | WEIGHT: 167.8 LBS

## 2021-01-04 DIAGNOSIS — E53.8 VITAMIN B12 DEFICIENCY: ICD-10-CM

## 2021-01-04 DIAGNOSIS — Z13.6 ENCOUNTER FOR LIPID SCREENING FOR CARDIOVASCULAR DISEASE: ICD-10-CM

## 2021-01-04 DIAGNOSIS — Z00.00 ENCOUNTER FOR ANNUAL PHYSICAL EXAM: Primary | ICD-10-CM

## 2021-01-04 DIAGNOSIS — E66.9 CLASS 1 OBESITY WITHOUT SERIOUS COMORBIDITY WITH BODY MASS INDEX (BMI) OF 32.0 TO 32.9 IN ADULT, UNSPECIFIED OBESITY TYPE: ICD-10-CM

## 2021-01-04 DIAGNOSIS — I10 ESSENTIAL HYPERTENSION: ICD-10-CM

## 2021-01-04 DIAGNOSIS — R61 NIGHT SWEATS: ICD-10-CM

## 2021-01-04 DIAGNOSIS — Z13.220 ENCOUNTER FOR LIPID SCREENING FOR CARDIOVASCULAR DISEASE: ICD-10-CM

## 2021-01-04 DIAGNOSIS — Z11.59 NEED FOR HEPATITIS C SCREENING TEST: ICD-10-CM

## 2021-01-04 DIAGNOSIS — E55.9 VITAMIN D DEFICIENCY: ICD-10-CM

## 2021-01-04 DIAGNOSIS — Z12.11 ENCOUNTER FOR SCREENING FOR MALIGNANT NEOPLASM OF COLON: ICD-10-CM

## 2021-01-04 PROBLEM — R51.9 HEADACHE: Status: RESOLVED | Noted: 2018-06-25 | Resolved: 2021-01-04

## 2021-01-04 PROBLEM — J98.11 ATELECTASIS: Status: RESOLVED | Noted: 2018-06-26 | Resolved: 2021-01-04

## 2021-01-04 PROBLEM — M75.101 RIGHT ROTATOR CUFF TEAR: Status: RESOLVED | Noted: 2018-06-25 | Resolved: 2021-01-04

## 2021-01-04 PROBLEM — R11.0 NAUSEA AFTER ANESTHESIA: Status: RESOLVED | Noted: 2018-06-25 | Resolved: 2021-01-04

## 2021-01-04 PROBLEM — T88.59XA NAUSEA AFTER ANESTHESIA: Status: RESOLVED | Noted: 2018-06-25 | Resolved: 2021-01-04

## 2021-01-04 PROBLEM — E66.811 CLASS 1 OBESITY WITHOUT SERIOUS COMORBIDITY WITH BODY MASS INDEX (BMI) OF 32.0 TO 32.9 IN ADULT: Status: ACTIVE | Noted: 2021-01-04

## 2021-01-04 PROBLEM — T81.9XXA POST-OPERATIVE COMPLICATION: Status: RESOLVED | Noted: 2018-06-27 | Resolved: 2021-01-04

## 2021-01-04 PROCEDURE — 99396 PREV VISIT EST AGE 40-64: CPT | Performed by: NURSE PRACTITIONER

## 2021-01-04 RX ORDER — MULTIPLE VITAMINS W/ MINERALS TAB 9MG-400MCG
1 TAB ORAL DAILY
COMMUNITY

## 2021-01-04 RX ORDER — PHENOL 1.4 %
600 AEROSOL, SPRAY (ML) MUCOUS MEMBRANE DAILY
COMMUNITY

## 2021-01-04 NOTE — PATIENT INSTRUCTIONS
Continue to monitor your blood pressure periodically and record results.  Continue healthy diet and exercise.  Follow up pending lab results.  Follow up in 1 year for annual physical, or sooner if symptoms persist or worsen.

## 2021-01-04 NOTE — PROGRESS NOTES
Subjective   Jaz Mora is a 50 y.o. female.     Chief Complaint   Patient presents with   • Annual Exam     fasting labs        History of Present Illness   Patient presents for CPE with fasting labs; healthy diet; regular exercise, previously walking 3 miles daily or hiking before weather cold; bought elliptical for winter; regular dental visits; last eye exam about 6 weeks ago, wears glasses; no problems hearing; immunizations: declines flu vaccine; sees Dr. Sales for female care; last PAP 12/2020, normal; last mammo 12/2020, normal; colonoscopy never performed; no family history of colon cancer; no problems with bowels; no blood in BM.    F/U HTN: has been off Amlodipine since has lost weight starting in April; has lost 65 pounds in last 6 months; monitors BP, typically runs 100s/70s; no headaches; no orthostasis; no swelling.    F/U Vitamin D deficiency: no longer taking Vitamin D weekly; takes daily MVI and caltrate daily.    Also, c/o night sweats and hot flashes; would like thyroid rechecked; had recent check with GYN and told not menopause yet; symptoms may be related to age, but would like thyroid checked today.    Carpal tunnel symptoms have improved with weight loss.    The following portions of the patient's history were reviewed and updated as appropriate: allergies, current medications, past family history, past medical history, past social history, past surgical history and problem list.    Current Outpatient Medications on File Prior to Visit   Medication Sig   • calcium carbonate (OS-REUBEN) 600 MG tablet Take 600 mg by mouth Daily.   • multivitamin with minerals tablet tablet Take 1 tablet by mouth Daily.   • [DISCONTINUED] amLODIPine (NORVASC) 5 MG tablet Take 1 tablet by mouth Daily.   • [DISCONTINUED] Cyanocobalamin (VITAMIN B-12) 5000 MCG lozenge Take 5,000 mg by mouth Daily.   • [DISCONTINUED] vitamin D (ERGOCALCIFEROL) 1.25 MG (11371 UT) capsule capsule Take 1 capsule by mouth 1 (One) Time  Per Week.     No current facility-administered medications on file prior to visit.        Past Medical History:   Diagnosis Date   • Allergic rhinitis    • Anxiety    • Arthralgia    • Atelectasis 6/26/2018   • Benign essential hypertension    • BMI 40.0-44.9, adult (CMS/[facility])    • Carpal tunnel syndrome on both sides 10 years    wears braces at night   • Decreased GFR    • Disease of thyroid gland    • Essential hypertension 6/25/2018   • Fatigue    • GERD (gastroesophageal reflux disease)    • Headache 6/25/2018   • Hematuria, microscopic     saw urology Dr. Christianson; cleared from urology standpoint   • Hypertension    • Lipid screening    • Microscopic hematuria    • Nausea after anesthesia 6/25/2018   • Post-operative complication 6/27/2018   • Right rotator cuff tear 6/25/2018   • Right shoulder pain    • Thyroid nodule     8/14/19 repeat US thyroid: no nodules, only normal-appearing lymph node noted   • Vitamin B12 deficiency    • Vitamin D deficiency        Past Surgical History:   Procedure Laterality Date   • BREAST SURGERY  1994    reduction   • ENDOMETRIAL ABLATION     • ROTATOR CUFF REPAIR Right    • TONSILLECTOMY         Family History   Problem Relation Age of Onset   • Rheum arthritis Mother    • COPD Mother    • Heart failure Mother    • Osteoarthritis Mother    • Diabetes Maternal Grandmother    • Osteoarthritis Maternal Grandmother        Social History     Socioeconomic History   • Marital status:      Spouse name: Not on file   • Number of children: Not on file   • Years of education: Not on file   • Highest education level: Not on file   Tobacco Use   • Smoking status: Never Smoker   • Smokeless tobacco: Never Used   Substance and Sexual Activity   • Alcohol use: Yes     Alcohol/week: 1.0 standard drinks     Types: 1 Cans of beer per week     Comment: socially   • Drug use: No   • Sexual activity: Defer       Review of Systems   Constitutional: Negative for appetite change, chills, fatigue,  "fever, unexpected weight gain and unexpected weight loss.   HENT: Negative for ear pain, sinus pressure, sore throat and trouble swallowing.    Eyes: Negative for blurred vision and pain.   Respiratory: Negative for cough, chest tightness and shortness of breath. Apnea: no snoring.    Cardiovascular: Negative for chest pain, palpitations and leg swelling.   Gastrointestinal: Negative for abdominal pain, blood in stool, constipation, diarrhea, GERD and indigestion.   Endocrine: Negative for heat intolerance and polydipsia. Cold intolerance: mild since weight loss.   Genitourinary: Negative for dysuria and frequency.   Musculoskeletal: Negative for arthralgias and back pain.   Skin: Negative for rash and skin lesions.   Neurological: Negative for dizziness, syncope and headache.   Hematological: Does not bruise/bleed easily.   Psychiatric/Behavioral: Negative for sleep disturbance (cannot sleep on right side since shoulder surgery) and depressed mood. The patient is not nervous/anxious.        Objective   Vitals:    01/04/21 0832   BP: 118/86   BP Location: Left arm   Patient Position: Sitting   Cuff Size: Adult   Pulse: 73   Temp: 98.4 °F (36.9 °C)   SpO2: 97%   Weight: 76.1 kg (167 lb 12.8 oz)   Height: 152.4 cm (60\")     Body mass index is 32.77 kg/m².    Physical Exam  Vitals signs and nursing note reviewed.   Constitutional:       General: She is not in acute distress.     Appearance: She is well-developed and well-groomed. She is not diaphoretic.   HENT:      Head: Normocephalic and atraumatic.      Jaw: No tenderness or pain on movement.      Right Ear: Tympanic membrane and external ear normal.      Left Ear: Tympanic membrane and external ear normal.      Nose: Nose normal.      Right Sinus: No maxillary sinus tenderness or frontal sinus tenderness.      Left Sinus: No maxillary sinus tenderness or frontal sinus tenderness.      Mouth/Throat:      Mouth: Mucous membranes are moist.      Pharynx: Oropharynx is " clear. No oropharyngeal exudate or posterior oropharyngeal erythema.   Eyes:      Extraocular Movements: Extraocular movements intact.      Conjunctiva/sclera: Conjunctivae normal.      Pupils: Pupils are equal, round, and reactive to light.   Neck:      Musculoskeletal: Normal range of motion and neck supple.      Thyroid: No thyroid mass or thyromegaly.      Vascular: No carotid bruit.      Trachea: No tracheal deviation.   Cardiovascular:      Rate and Rhythm: Normal rate and regular rhythm.      Pulses: Normal pulses.      Heart sounds: Normal heart sounds. No murmur.   Pulmonary:      Effort: Pulmonary effort is normal. No respiratory distress.      Breath sounds: Normal breath sounds.   Abdominal:      General: Bowel sounds are normal.      Palpations: Abdomen is soft. There is no hepatomegaly or splenomegaly.      Tenderness: There is no abdominal tenderness.   Musculoskeletal: Normal range of motion.      Cervical back: She exhibits no bony tenderness.      Thoracic back: She exhibits no bony tenderness.      Lumbar back: She exhibits no bony tenderness.      Right lower leg: No edema.      Left lower leg: No edema.   Lymphadenopathy:      Cervical: No cervical adenopathy.   Skin:     General: Skin is warm and dry.      Findings: No rash.   Neurological:      Mental Status: She is alert and oriented to person, place, and time.      Cranial Nerves: No cranial nerve deficit.      Motor: Motor function is intact.      Coordination: Coordination normal.      Gait: Gait normal.      Deep Tendon Reflexes: Reflexes are normal and symmetric.   Psychiatric:         Mood and Affect: Mood normal.         Behavior: Behavior normal.         Thought Content: Thought content normal.         Cognition and Memory: Cognition normal.         Judgment: Judgment normal.         Lab Results   Component Value Date    WBC 12.40 (H) 06/25/2018    RBC 5.03 06/25/2018    HGB 14.7 06/25/2018    HCT 46.1 (H) 06/25/2018    MCV 91.7  06/25/2018    MCH 29.2 06/25/2018    MCHC 31.9 (L) 06/25/2018    RDW 13.1 (H) 06/25/2018    RDWSD 43.8 06/25/2018    MPV 10.1 06/25/2018     06/25/2018    NEUTRORELPCT 87.5 (H) 06/25/2018    LYMPHORELPCT 10.7 (L) 06/25/2018    MONORELPCT 1.5 (L) 06/25/2018    EOSRELPCT 0.0 (L) 06/25/2018    BASORELPCT 0.1 06/25/2018    AUTOIGPER 0.2 06/25/2018    NEUTROABS 10.86 (H) 06/25/2018    LYMPHSABS 1.33 06/25/2018    MONOSABS 0.18 (L) 06/25/2018    EOSABS 0.00 06/25/2018    BASOSABS 0.01 06/25/2018    AUTOIGNUM 0.02 06/25/2018     Lab Results   Component Value Date    GLUCOSE 136 (H) 06/25/2018    BUN 16 08/14/2019    CREATININE 1.08 (H) 08/14/2019    EGFRIFNONA 61 08/14/2019    EGFRIFAFRI 70 08/14/2019    BCR 15 08/14/2019    K 4.2 08/14/2019    CO2 22 08/14/2019    CALCIUM 9.5 08/14/2019    ALBUMIN 4.10 06/25/2018    AST 16 06/25/2018    ALT 16 06/25/2018        Assessment/Plan .  Problem List Items Addressed This Visit     Vitamin B12 deficiency    Relevant Orders    Vitamin B12 & Folate    Vitamin D deficiency    Relevant Orders    Vitamin D 25 Hydroxy    Night sweats    Relevant Orders    CBC & Differential    TSH    T4, Free    Class 1 obesity without serious comorbidity with body mass index (BMI) of 32.0 to 32.9 in adult    Current Assessment & Plan     Obesity is improving with lifestyle modifications.  Discussed the patient's BMI.  The BMI is above average; BMI management plan is completed.  General weight loss/lifestyle modification strategies discussed (elicit support from others; identify saboteurs; non-food rewards, etc).  Regular aerobic exercise program discussed.         RESOLVED: Essential hypertension      Other Visit Diagnoses     Encounter for annual physical exam    -  Primary    Relevant Orders    CBC & Differential    Comprehensive Metabolic Panel    Lipid Panel With LDL / HDL Ratio    TSH    T4, Free    Encounter for screening for malignant neoplasm of colon        Relevant Orders    Cologuard  - Stool, Per Rectum    Need for hepatitis C screening test        Relevant Orders    Hepatitis C Antibody    Encounter for lipid screening for cardiovascular disease        Relevant Orders    Lipid Panel With LDL / HDL Ratio          Return in about 1 year (around 1/4/2022) for Annual physical, Recheck.or sooner if symptoms persist or worsen.  Impression: Health maintenance visit.  Currently, eats a healthy diet and has a regular exercise routine.  Cervical cancer screening: UTD.  Breast cancer screening: UTD.  Colorectal cancer screening: ordered Cologuard.  Screening lab work includes: CMP, lipid.  Immunizations: UTD, declines flu vaccine.  Advice and education were given regarding nutrition and aerobic exercise.

## 2021-01-05 LAB
25(OH)D3+25(OH)D2 SERPL-MCNC: 31.6 NG/ML (ref 30–100)
ALBUMIN SERPL-MCNC: 4.3 G/DL (ref 3.5–5.2)
ALBUMIN/GLOB SERPL: 2 G/DL
ALP SERPL-CCNC: 49 U/L (ref 39–117)
ALT SERPL-CCNC: 13 U/L (ref 1–33)
AST SERPL-CCNC: 12 U/L (ref 1–32)
BASOPHILS # BLD AUTO: 0.06 10*3/MM3 (ref 0–0.2)
BASOPHILS NFR BLD AUTO: 1 % (ref 0–1.5)
BILIRUB SERPL-MCNC: 0.4 MG/DL (ref 0–1.2)
BUN SERPL-MCNC: 21 MG/DL (ref 6–20)
BUN/CREAT SERPL: 23.3 (ref 7–25)
CALCIUM SERPL-MCNC: 9.2 MG/DL (ref 8.6–10.5)
CHLORIDE SERPL-SCNC: 103 MMOL/L (ref 98–107)
CHOLEST SERPL-MCNC: 177 MG/DL (ref 0–200)
CO2 SERPL-SCNC: 26.6 MMOL/L (ref 22–29)
CREAT SERPL-MCNC: 0.9 MG/DL (ref 0.57–1)
EOSINOPHIL # BLD AUTO: 0.15 10*3/MM3 (ref 0–0.4)
EOSINOPHIL NFR BLD AUTO: 2.4 % (ref 0.3–6.2)
ERYTHROCYTE [DISTWIDTH] IN BLOOD BY AUTOMATED COUNT: 12.8 % (ref 12.3–15.4)
FOLATE SERPL-MCNC: 7.12 NG/ML (ref 4.78–24.2)
GLOBULIN SER CALC-MCNC: 2.1 GM/DL
GLUCOSE SERPL-MCNC: 78 MG/DL (ref 65–99)
HCT VFR BLD AUTO: 41.4 % (ref 34–46.6)
HCV AB S/CO SERPL IA: <0.1 S/CO RATIO (ref 0–0.9)
HDLC SERPL-MCNC: 57 MG/DL (ref 40–60)
HGB BLD-MCNC: 13.8 G/DL (ref 12–15.9)
IMM GRANULOCYTES # BLD AUTO: 0.01 10*3/MM3 (ref 0–0.05)
IMM GRANULOCYTES NFR BLD AUTO: 0.2 % (ref 0–0.5)
LDLC SERPL CALC-MCNC: 106 MG/DL (ref 0–100)
LDLC/HDLC SERPL: 1.84 {RATIO}
LYMPHOCYTES # BLD AUTO: 3.16 10*3/MM3 (ref 0.7–3.1)
LYMPHOCYTES NFR BLD AUTO: 50.2 % (ref 19.6–45.3)
MCH RBC QN AUTO: 29.6 PG (ref 26.6–33)
MCHC RBC AUTO-ENTMCNC: 33.3 G/DL (ref 31.5–35.7)
MCV RBC AUTO: 88.8 FL (ref 79–97)
MONOCYTES # BLD AUTO: 0.32 10*3/MM3 (ref 0.1–0.9)
MONOCYTES NFR BLD AUTO: 5.1 % (ref 5–12)
NEUTROPHILS # BLD AUTO: 2.6 10*3/MM3 (ref 1.7–7)
NEUTROPHILS NFR BLD AUTO: 41.1 % (ref 42.7–76)
NRBC BLD AUTO-RTO: 0 /100 WBC (ref 0–0.2)
PLATELET # BLD AUTO: 217 10*3/MM3 (ref 140–450)
POTASSIUM SERPL-SCNC: 4 MMOL/L (ref 3.5–5.2)
PROT SERPL-MCNC: 6.4 G/DL (ref 6–8.5)
RBC # BLD AUTO: 4.66 10*6/MM3 (ref 3.77–5.28)
SODIUM SERPL-SCNC: 139 MMOL/L (ref 136–145)
T4 FREE SERPL-MCNC: 1.04 NG/DL (ref 0.93–1.7)
TRIGL SERPL-MCNC: 75 MG/DL (ref 0–150)
TSH SERPL DL<=0.005 MIU/L-ACNC: 2.53 UIU/ML (ref 0.27–4.2)
VIT B12 SERPL-MCNC: 629 PG/ML (ref 211–946)
VLDLC SERPL CALC-MCNC: 14 MG/DL (ref 5–40)
WBC # BLD AUTO: 6.3 10*3/MM3 (ref 3.4–10.8)

## 2021-01-21 ENCOUNTER — TELEPHONE (OUTPATIENT)
Dept: FAMILY MEDICINE CLINIC | Facility: CLINIC | Age: 51
End: 2021-01-21

## 2021-01-21 NOTE — TELEPHONE ENCOUNTER
PT CALLED STATING SHE HAD THE COVID ANTIBODY TEST AT OUR OFFICE A FEW WEEKS AGO, AND HAS STILL NOT RECEIVED HER RESULTS THROUGH THE [facility] PORTAL.    SHE IS HOPING WE CAN HELP HER GET HER RESULTS. SHE CAN BE REACHED -[phone], GAVE PERMISSION TO LEAVE RESULTS ON [facility]

## 2021-01-22 NOTE — TELEPHONE ENCOUNTER
INFORMED PATIENT THAT WE DO NOT HAVE ANY RESULTS IN OUR OFFICE THAT SHE CAN TRY AND CALL [facility] IF SHE IS THAT CONCERNED BUT I ALSO INFORMED HER THAT YOU ARE REALLY NOT SUPPOSE TO CALL THEM FOR RESULTS. PATIENT WILL WAIT AND SEE IF THEY ARRIVE

## 2021-03-30 ENCOUNTER — BULK ORDERING (OUTPATIENT)
Dept: CASE MANAGEMENT | Facility: OTHER | Age: 51
End: 2021-03-30

## 2021-03-30 DIAGNOSIS — Z23 IMMUNIZATION DUE: ICD-10-CM

## 2022-01-10 NOTE — PROGRESS NOTES
Physical Therapy Daily Progress Note    VISIT#: 5    Subjective   Jaz Mora reports: that she is having some increase in pain.  States that she has had some increase in tenderness in the lateral shoulder.  Unknown of cause of increase in symptoms.  Has some aching the radial wrist as well.   States that she has stopped taking the anti - inflams as they are giving her hot flashes       Objective   Tenderness thumb abd tendon and biceps tendon in shoulder      Redness noted in anterior lateral shoulder from skin irritation from electrode    See Exercise, Manual, and Modality Logs for complete treatment.     Patient Education: discussed need for continued anti inflam medication - spoke with [facility] and a prescription was written for a steroid pack    Assessment/Plan  Some increased pain over the weekend as patient stopped taking meds due to side effects.  Persistent impingement in the shoulder.    Progress per Plan of Care           Manual Therapy:    16     mins  18555;  Therapeutic Exercise:    10     mins  73914;     Neuromuscular Amber:    0    mins  01587;    Therapeutic Activity:     0     mins  13819;       Timed Treatment:   42   mins   Total Treatment:     60   mins    Giovana Bonner, PT  [place] License # [id number]  Physical Therapist       SSKI Counseling:  I discussed with the patient the risks of SSKI including but not limited to thyroid abnormalities, metallic taste, GI upset, fever, headache, acne, arthralgias, paraesthesias, lymphadenopathy, easy bleeding, arrhythmias, and allergic reaction.

## 2022-04-11 ENCOUNTER — E-VISIT (OUTPATIENT)
Dept: FAMILY MEDICINE CLINIC | Facility: TELEHEALTH | Age: 52
End: 2022-04-11

## 2022-04-11 PROCEDURE — BRIGHTMDVISIT: Performed by: NURSE PRACTITIONER

## 2022-04-11 NOTE — EXTERNAL PATIENT INSTRUCTIONS
Note   If your symptoms do not improve in 3-5 days follow up with urgent care or your primary care provider.   Diagnosis   Bacterial sinusitis   My name is Lizzy William, and I'm a healthcare provider at [facility]. I'm sorry you're not feeling well. I reviewed your interview, and I see that you have bacterial sinusitis.   To prevent the spread of illness to others, I recommend that you stay home and away from other people as much as possible while you're sick.   Medications   Your pharmacy   [id number] [address] [id number] [phone]     Prescription   Doxycycline hyclate oral tablet (100mg): Take 1 tablet by mouth twice a day for 10 days for infection. This medication is an antibiotic. Take it exactly as directed. You must finish the entire course of medication, even if you feel better after the first few days of treatment.    Start taking the antibiotics I've prescribed right away. You need to finish the entire course of antibiotics, even if you start to feel better before the pills run out.   Some women develop yeast infections after taking antibiotics. If you develop a yeast infection, you can treat it with antifungal creams or suppositories. These are available without a prescription at [employer] and many supermarkets.   About your diagnosis   The sinuses are hollow spaces connected to the nasal passages. Sinusitis occurs when the sinuses swell and block the drainage of fluid and mucus from the nose, causing pain, pressure, and congestion. Fatigue, difficulty sleeping, or decreased appetite may accompany your symptoms.   More than 90% of sinus infections are caused by viruses. However, in certain cases, a sinus infection may be caused by bacteria. Bacterial sinus infections usually look like one of the following cases:    Severe sinus symptoms with a fever over 102F.    Sinus symptoms that have not improved at all after 10 days.    Cold symptoms  that slowly improve but then worsen again after 5 or 6 days, usually with a high fever, headache, or nasal discharge.   What to expect   If you follow this treatment plan, you should start to feel better within a few days.   You can return to your normal activities when ALL of the following are true:    You've been fever-free for more than 24 hours without using fever-reducing medications such as Tylenol    Your other symptoms have improved    It's been at least 5 full days since your symptoms first started   When to seek care   Call us at 1 [phone]   with any sudden or unexpected symptoms.    Symptoms that last longer than 10 to 14 days.    Symptoms that get better for a few days, and then suddenly get worse.    Fever that measures over 103F or continues for more than 3 days.    Any vision changes.    A worsening headache.    Stiff neck.    Swelling of your forehead or eyes.    Coughing up red or bloody mucus.    Swallowing becomes extremely difficult or impossible.    More than 5 episodes of diarrhea in a day.    More than 5 episodes of vomiting in a day.    Severe shortness of breath.    Severe chest pain   Other treatment    Rest! Your body needs rest to recover and fight infection.    Drink plenty of water to stay hydrated.    Use steam to soothe your sinuses: Breathe it in from a shower or a bowl of hot water. Placing a warm, moist washcloth over your nose and forehead may help relieve the sinus pain and pressure.    Try non-prescription saline nasal sprays to help your nasal symptoms. Try using a Neti Pot to flush out your stuffy nose and sinuses. Neti Pots are available at any drugstore without a prescription.    Avoid smoke and air pollution. Smoke can make infections worse.   Prevention    Avoid close contact with other people when you're sick.    Cover your mouth and nose when you cough or sneeze. Use a tissue or cough into your elbow. Make sure that used tissues go directly into the trash.     Avoid touching your eyes, nose, or mouth while you're sick.    Wash your hands often, especially after coughing, sneezing, or blowing your nose. If soap and water are not available, use an alcohol-based hand .    If you or someone in your home or workplace is sick, disinfect commonly used items. This includes door handles, tables, computers, remotes, and pens.    Coronavirus (COVID-19) information   Common symptoms of COVID-19 include fever, cough, shortness of breath, fatigue, muscle or body aches, headaches, new loss of sense of taste or smell, sore throat, stuffy or runny nose, nausea or vomiting, and diarrhea. Most people who get COVID-19 have mild symptoms and can rest at home until they get better. Elderly people and those with chronic medical problems may be at risk for more serious complications.   FAQs about the COVID-19 vaccine   There are three authorized COVID-19 vaccines: [employer] Itz Vaccine (J&J[email or web address]), Moderna, and Pfizer-BioNTech (Pfizer). The J&J/Itz and Moderna vaccines are approved for use in people aged 18 and older. The Pfizer vaccine is approved for those aged 5 and older. All three are available at no cost. Even if you don't have health insurance, you can still get the COVID-19 vaccine for free.   Which vaccine is the best? Which vaccine should I get?   All three vaccines are highly effective. Even if you get COVID-19 after being vaccinated, all of the vaccines help prevent severe disease, hospitalization, and complications.   Most people should get whichever vaccine is first available to them. However, women younger than 50 years old should consider the rare risk of blood clots with low platelets after vaccination with the J&J/Itz vaccine. This risk hasn't been seen with the other two vaccines.   Are the vaccines safe?   Yes. Hundreds of millions of people in the [place] have already safely received COVID-19 vaccines. As part of Phase 3 clinical trials in the   and other countries, researchers collected safety and efficacy data for all three vaccines. These clinical trials follow strict standards. Before a vaccine is approved, the manufacturing company must submit data to the Food and Drug Administration (FDA) for review. Tens of thousands of volunteers participated in the clinical trials for the vaccines. The FDA continues to monitor safety data as the vaccines are given to the general population.   Do I need the vaccine if I've already had COVID?   Yes. Vaccination helps protect you even if you've already had COVID.   If you had COVID-19 and had symptoms, wait to get vaccinated until ALL of the following are true:    5 full days since your symptoms started    24 hours with no fever, without the use of fever-reducing medications    Your other COVID-19 symptoms are improving   If you tested positive for COVID-19 but did not have symptoms, you can get vaccinated after 5 full days have passed since you had a positive test, as long as you don't develop symptoms.   If you had COVID-19 and were treated with monoclonal antibodies, you should wait 90 days before getting a COVID-19 vaccination.   How many doses of the vaccine do I need?   Visit https://www.cdc.gov/coronavirus/2019-ncov/vaccines/stay-up-to-date.html   to find out how to stay up to date with your COVID-19 vaccines.   I'm immunocompromised. How many doses of the vaccine do I need?   Visit https://www.cdc.gov/coronavirus/2019-ncov/vaccines/recommendations/immuno.html   for more information on how immunocompromised people can stay up to date with their COVID-19 vaccines.   What are the common side effects of the vaccine?   A sore arm, tiredness, headache, and muscle pain may occur within two days of getting the vaccine and last a day or two. For the Moderna or Pfizer vaccines, side effects are more common after the second dose. People over the age of 55 are less likely to have side effects than younger people.   After  I'm up to date on vaccines, can I still get or spread COVID?   Yes, but your disease should be milder. And your risk of serious illness, hospitalization, and complications will be much lower, especially if you're up to date. Being vaccinated reduces the risk of spreading the disease if you get it.   After I'm up to date on vaccines, can I go back to normal?   You should still wear a mask indoors in public if:    It's required by laws, rules, regulations, or local guidance.    You have a weakened immune system.    Your age puts you at increased risk of severe disease.    You have a medical condition that puts you at increased risk of severe disease.    Someone in your household has a weakened immune system, is at increased risk for severe disease, or is unvaccinated.    You're in an area of high transmission.   For travel information, see the CDC's latest guidance at https://www.cdc.gov  .   Everyone who tests positive for COVID, regardless of vaccination or booster status, should:    Stay home for at least 5 days. Day 1 is the first full day after your symptoms started. If you never develop symptoms, day 1 is the first full day after the sample was taken for the test.    If you have symptoms, continue to stay home until you're fever free for 24 hours without the use of fever-reducing medicines and your other symptoms have improved.    If you never develop symptoms, you may leave your house after day 5.    You should wear a well-fitting mask around others at home and in public until day 10, even if you don't have symptoms or your symptoms are improving.    Don't travel until after at least day 10.    Don't go to places where you can't wear a mask, such as restaurants, bars, or gyms.   If you're exposed to COVID, follow the advice below based on your vaccination status.   If any of these apply:    You're 18 or older and have had all recommended vaccine doses, including boosters and additional primary shots for certain  immunocompromised people    You're 5 to 17 years old and have had 2 doses of the Pfizer vaccine    You've had COVID-19 within the last 90 days, confirmed by a nose or throat swab test   Then:    Wear a well-fitting mask around others for 10 days. The date of last close contact is considered day 0.    Get tested at least 5 days after you last had close contact with someone with COVID-19.   If any of these apply:    You're 18 or older, have had two doses of the Pfizer or Moderna vaccine, but have NOT gotten a recommended booster shot    You got a dose of the J&J/Itz vaccine more than 2 months ago, but you have NOT gotten a recommended booster shot    You're not vaccinated   Then:    Stay home for at least 5 days after your last close contact with a person who has COVID-19. The date of last close contact is considered day 0.    Wear a well-fitting mask for 10 days when around others at home or in public.    Watch for fever, cough, shortness of breath, or other COVID-19 symptoms for 10 days after your last close contact with someone with COVID-19.    If you develop symptoms, get tested right away.    If you don't develop symptoms, get tested at least 5 days after your last close contact with someone with COVID-19.   For more information about exposure, isolation, and quarantine, see https://www.cdc.gov/coronavirus/2019-ncov/your-health/quarantine-isolation.html  .   General information about COVID-19   What should I do if I'm exposed to someone with COVID-19?   In general, you need to be in close contact with someone who has COVID-19 to get infected. Close contact means:    Living in the same household as someone with COVID-19.    Caring for someone with COVID-19.    Being within 6 feet of someone with COVID-19 for a total of at least 15 minutes over a 24-hour period.    Being in direct contact with respiratory droplets from someone with COVID-19 (for example, being coughed on, kissing, or sharing utensils).    Everyone who tests positive for COVID, regardless of vaccination or booster status, should:    Stay home for at least 5 days. Day 1 is the first full day after your symptoms started. If you never develop symptoms, day 1 is the first full day after the sample was taken for the test.    If you have symptoms, continue to stay home until you're fever free for 24 hours without the use of fever-reducing medicines and your other symptoms have improved.    If you never develop symptoms, you may leave your house after day 5.    You should wear a well-fitting mask around others at home and in public until day 10, even if you don't have symptoms or if your symptoms are improving.    Don't travel until after at least day 10.    Don't go to places where you can't wear a mask, such as restaurants, bars, or gyms.   If you're exposed to COVID, follow the advice below based on your vaccination status.   If any of these apply:    You're 18 or older and have had all recommended vaccine doses, including boosters and additional primary shots for certain immunocompromised people    You're 5 to 17 years old and have had 2 doses of the Pfizer vaccine    You've had COVID-19 within the last 90 days, confirmed by a nose or throat swab test   Then:    Wear a well-fitting mask around others for 10 days. The date of last close contact is considered day 0.    Get tested at least 5 days after you last had close contact with someone with COVID-19.   If any of these apply:    You're 18 or older, have had two doses of the Pfizer or Moderna vaccine, but have NOT gotten a recommended booster shot    You got a dose of the J&J/Itz vaccine more than 2 months ago, but you have NOT gotten a recommended booster shot    You're not vaccinated   Then:    Stay home for at least 5 days after your last close contact with a person who has COVID-19. The date of last close contact is considered day 0.    Wear a well-fitting mask for 10 days when around others at  home or in public.    Watch for fever, cough, shortness of breath, or other COVID-19 symptoms for 10 days after your last close contact with someone with COVID-19.    If you develop symptoms, get tested right away.    If you don't develop symptoms, get tested at least 5 days after your last close contact with someone with COVID-19.   What if I develop symptoms of COVID-19?   Get tested right away if you develop symptoms.   Everyone who tests positive, regardless of vaccination or booster status, should:    Stay home for at least 5 days. Day 1 is the first full day after your symptoms started. If you never develop symptoms, day 1 is the first full day after the sample was taken for the test.    If you have symptoms, continue to stay home until you're fever free for 24 hours without the use of fever-reducing medicines and your other symptoms have improved.    If you never develop symptoms, you may leave your house after day 5.    You should wear a well-fitting mask around others at home and in public until day 10, even if you don't have symptoms or if your symptoms are improving.    Don't travel until after at least day 10.    Don't go to places where you can't wear a mask, such as restaurants, bars, or gyms.   CALL your healthcare provider or clinic right away to discuss next steps if you have any of these risk factors:    Age 65 or older    Pregnant    Chronic medical condition such as diabetes, liver disease, kidney disease requiring dialysis, heart disease, high blood pressure, severe obesity, or lung disease (including moderate to severe asthma)    A medical condition that affects your immune system    Taking a medication that affects your immune system   Otherwise, if your symptoms are mild, you don't need to call your healthcare provider or be seen for an exam. You can recover at home. Over-the-counter cold medications can help ease symptoms.   To prevent the spread of COVID-19 to the people and animals in your  household:    Stay in a specific room away from other people in your home, and use a separate bathroom if possible    Wear a mask when close contact with household members can't be avoided   When to get care   Call your healthcare provider immediately if you have any of the following:    Fever over 103F    Fever that doesn't come down after taking medications such as Tylenol or ibuprofen    Fever that returns after being gone for more than 24 hours    Fever lasting more than 4 days    Worsening shortness of breath or difficulty breathing   Go to your nearest ER or call 911 if you have any of the following:    Shortness of breath that makes it hard to do simple things like get dressed, bathe, or comb your hair    Persistent chest pain or chest tightness    New confusion or difficulty staying alert    Bluish color to the lips or face    Flu vaccine information   Getting a flu vaccine this year is more important than ever. The vaccine not only protects you and the people around you from the flu, it also helps reduce the strain on healthcare systems responding to the COVID-19 pandemic.   Who should get a flu vaccine?   Everyone 6 months of age and older should get a yearly flu vaccine.   When should I get vaccinated?   You should get a flu vaccine by the end of October. Once you're vaccinated, it takes about two weeks for antibodies to develop and protect you against the flu. That's why it's important to get vaccinated as soon as possible.   After October, is it too late to get vaccinated?   No. You should still get vaccinated. As long as the flu viruses are still in your community, flu vaccines will remain available, even into January of next year or later.   Why do I need a flu vaccine EVERY year?   Flu viruses are constantly changing, so flu vaccines are usually updated from one season to the next. Your protection from the flu vaccine also lessens over time.   Is the flu vaccine safe?   Yes. Over the last 50 years,  hundreds of millions of Americans have safely received the flu vaccines.   What are the side effects of flu vaccines?   You CANNOT get the flu from a flu vaccine. Common side effects of the flu shot include soreness, redness and/or swelling where the shot was given, low grade fever, and aches. Common side effects of the nasal spray flu vaccine for adults include runny nose, headaches, sore throat, and cough. For children, side effects include wheezing, vomiting, muscle aches, and fever.   Does the flu vaccine increase your risk of getting COVID-19?   No. There is no evidence that getting a flu vaccine increases your risk of getting COVID-19.   Is it safe to get the flu vaccine along with a COVID-19 vaccine?   Yes. It's safe to get the flu vaccine with a COVID-19 vaccine or booster.   Contact your healthcare provider TODAY for details on when and where to get your flu vaccine.   Your provider   Your diagnosis was provided by Lizzy William, a member of your trusted care team at [facility].   If you have any questions, call us at [phone]  .

## 2022-04-11 NOTE — E-VISIT TREATED
Chief Complaint: Coronavirus (COVID-19), cold, sinus pain, allergy, or flu   Patient introduction   Patient is 52-year-old female who reports congestion, sore throat, and voice hoarseness. Regarding date of symptom onset, patient writes: 4/3/2022.   When asked why they're seeking care online today, patient reports they just want to feel better.   Patient has not requested COVID testing.   COVID-19 exposure, testing history, and vaccination status:    No known exposure to a confirmed or suspected case of COVID-19.    No recent travel outside of their local community.    Patient had a viral lab test on 4/4/2022. Test result was negative.    Reports receiving 2 doses of the COVID-19 vaccine (Pfizer, Pfizer). Received their most recent dose of the vaccine > 14 days ago.   Risk factors for severe disease from COVID-19 infection:    BMI >= 25   Patient-submitted comments: Last week, 4/4/22-4/6/22, I had flu-like symptoms in addition to this sinus issue. Ran an intermittent fever all 3 days (highest was 100.3). All other symptoms have passed other than this nasal drip, sore throat. Intermittent cough/blowing nose produces green/thick mucous. I believe it's a sinus infection and have taken z-paks in the past with no issues..   Patient did not request an excuse note.   General presentation   Symptoms came on suddenly.   Fever:    Denies fever.   Sinus and nasal symptoms:    Reports green nasal drainage.    Nasal drainage is thick.    Reports postnasal drip.    Reports congestion without associated pain or pressure.    Denies rhinitis.    Denies itchy nose or sneezing.   Sore throat:    Reports sore throat.    Reports previous tonsillectomy.    Denies recent strep exposure.    Patient does not think they have strep.    Patient is able to swallow liquids and solid foods with ease.    Reports mild hoarseness. Patient doesn't believe hoarseness is due to voice strain.   Head and body aches:    Denies headache.    Denies  sweats.    Denies chills.    Denies myalgia.    Denies fatigue.   Cough:    Denies cough.   Wheezing and SOB:    Denies COPD diagnosis.    Denies asthma diagnosis.    Denies wheezing.    Denies shortness of breath.    Denies previous albuterol inhaler use during URIs, bronchitis, or pneumonia.    Denies previous steroid inhaler use during URIs, bronchitis, or pneumonia.   Chest pain:    Denies chest pain.   Dizziness:    Denies dizziness.   Allergies:    Denies history of allergies.   Flu exposure:    Denies recent exposure to confirmed flu diagnosis.    Denies receiving a flu vaccine this season.   Patient denies the following red flags:    Changes in alertness or awareness    Symptoms suggesting airway obstruction    Decreased urination   Pregnancy/menstrual status/breastfeeding:    Denies being pregnant    Denies breastfeeding    Regarding last menstrual period, patient writes: No periods 10+ years, had an ablation.   Self-exam:    No difficulty moving their chin toward their chest    No palatal petechiae    Neck lymph nodes feel normal    Denies antibiotic treatment for similar symptoms within the past month   Current medications   Reports taking over-the-counter medication for current symptoms. Patient has taken acetaminophen, dextromethorphan, fluticasone, guaifenesin/dextromethorphan, and ibuprofen.   Denies taking other medications or supplements.   Medication allergies    Diphenhydramine    Penicillins   Medication contraindication review   Denies history of anaphylactic reaction to beta-lactam antibiotics; aspirin triad; blood dyscrasia; bone marrow depression; catecholamine-releasing paraganglioma; coronary artery disease; coagulation disorder; congenital long QT syndrome; depression; electrolyte abnormalities; fungal infection; GI bleeding; GI obstruction; G6PD deficiency; heart arrhythmia; hypertension; kidney disease or hemodialysis; mononucleosis; myasthenia; recent myocardial infarction;  NSAID-induced asthma/urticaria; Parkinson's disease; pheochromocytoma; porphyria; Reye syndrome; seizure disorder; ulcerative colitis; and urinary retention.   Denies history of metoclopramide-associated dystonic reaction and tardive dyskinesia.   No known history of azithromycin-associated cholestatic jaundice or hepatic impairment.   Past medical history   Immune conditions: Denies immunocompromising conditions. Denies history of cancer.   Social history   Non-smoker.   Assessment   Bacterial sinusitis. Ruled out: Traumatic laryngitis.   This is the likely diagnosis based on patient's interview responses, including:    Congestion or sinus pressure    Thick nasal discharge    Yellow or green mucus   HHS required information for COVID-19 lab data reporting (if test is ordered):   Symptoms:    Sore throat    Nasal congestion     Pregnancy: No or N/A  Healthcare worker? No  Resident in a congregate care setting? No  Previous history of COVID-19 testing: Patient had a viral lab test on 4/4/2022. Test result was negative.     Plan   Medications:    doxycycline hyclate 100 mg tablet RX 100mg 1 tab PO bid 10d for infection. This medication is an antibiotic. Take it exactly as directed. You must finish the entire course of medication, even if you feel better after the first few days of treatment. Amount is 20 tab.   The patient's prescription will be sent to:   [id number]   [id number] [id number]   Phone: [phone]     Fax: [phone]   Education:    Condition and causes    Prevention    Treatment and self-care    When to call provider   ----------   Electronically signed by RAFFI Ott on 2022-04-11 at 08:48AM   ----------   Patient Interview Transcript:   Why are you getting care through eVisit today? We can't guarantee a specific treatment or test. Your provider will decide what's best for you. Select all that apply.    I just want to feel better!   Not  selected:    I want to know if I have a cold or something more serious    I want to know if I need to be seen by a provider    I need a doctor's note    I want to be tested for COVID-19    I want to get the COVID-19 vaccine    I think I'm having side effects from the COVID-19 vaccine    None of the above   COVID-19 symptoms are similar to symptoms of other illnesses. This makes it difficult to diagnose. Please carefully consider each question and answer as best you can. This will help your provider make the right diagnosis. Which of these symptoms are bothering you? Select all that apply.    Stuffed-up nose or sinuses    Sore throat    Hoarse voice or loss of voice   Not selected:    Cough    Shortness of breath    Fever    Runny nose    Itchy or watery eyes    Itchy nose or sneezing    Loss of smell or taste    Headache    Sweats    Chills    Muscle or body aches    Fatigue or tiredness    Nausea or vomiting    Diarrhea    I don't have any of these symptoms   Do you have any of these conditions? These conditions can put you at increased risk for severe disease if you're infected with COVID-19. Select all that apply.    None of the above   Not selected:    Chronic lung disease, such as cystic fibrosis or interstitial fibrosis    Heart disease, such as congenital heart disease, congestive heart failure, or coronary artery disease    Disorder of the brain, spinal cord, or nerves and muscles, such as dementia, cerebral palsy, epilepsy, muscular dystrophy, or developmental delay    Metabolic disorder or mitochondrial disease    Cerebrovascular disease, such as stroke or another condition affecting the blood vessels or blood supply to the brain    Down syndrome    Mood disorder, including depression or schizophrenia spectrum disorders    Substance use disorder, such as alcohol, opioid, or cocaine use disorder    Tuberculosis   Do you live in a group care setting? Examples include: - Nursing home - Residential care -  Psychiatric treatment facility - Group home - [facility] - [facility] and care home - Homeless shelter - Foster care setting Select one.    No   Not selected:    Yes   Have you ever been tested for COVID-19? Select one.    Yes   Not selected:    No   When was your most recent COVID-19 test? Select one.    Within the last week (specify date in MM/DD/YYYY format): 4/4/2022   Not selected:    7 to 14 days ago    15 to 30 days ago    1 to 3 months ago    More than 3 months ago   What type of COVID-19 test did you most recently have? There are two types of COVID-19 tests: - Viral tests check if you're currently infected with COVID-19. For these tests, a nose swab or saliva sample is taken. Viral tests include self-tests and tests done at a doctor's office, lab, or testing site. - Antibody tests check if you've been infected in the past. For these tests, your blood is drawn. Antibody tests can only be done at a doctor's office, lab, or testing site. Select one.    Viral test at a doctor's office, lab, or testing site   Not selected:    Viral self-test    Antibody test   What was the result of your most recent COVID-19 test? Select one.    Negative   Not selected:    Positive    I'm not sure   Have you gotten the COVID-19 vaccine? Select one.    Yes   Not selected:    No   How many doses of the COVID-19 vaccine have you gotten? This includes boosters as well as third or fourth doses for those who are immunocompromised. Select one.    2 doses   Not selected:    1 dose    3 doses    4 doses   Which COVID-19 vaccine did you get for your first dose? Check your Vaccination Record Card under Product Name/. Select one.    PfizerSilith.IOBioNTLearnerator (Pfizer)   Not selected:    Bird & Bird's Itz Vaccine (J&J/Itz)    Moderna   Which COVID-19 vaccine did you get for your second dose? Check your Vaccination Record Card under Product Name/. Select one.    Pfizer-BioNTech (Pfizer)   Not selected:    Bird &  "Bird's Itz Vaccine (J&J/Itz)    Moderna   When did you get your most recent dose of the COVID-19 vaccine?    More than 14 days ago   Not selected:    Less than 48 hours (2 days) ago    48 to 72 hours (3 days) ago    3 to 5 days ago    5 to 7 days ago    7 to 14 days ago   In the last 14 days, have you traveled outside of your local community? This includes travel by car, RV, bus, train, or plane. Travel increases your chances of getting and spreading COVID-19. Select one.    No   Not selected:    Yes   In the last 14 days, have you had close contact with someone who has coronavirus (COVID-19)? \"Close contact\" means any of these: - Living in the same household as someone with COVID-19. - Caring for someone with COVID-19. - Being within 6 feet of someone with COVID-19 for a total of at least 15 minutes over a 24-hour period. For example, three 5-minute exposures for a total of 15 minutes. - Being in direct contact with respiratory droplets from someone with COVID-19 (being coughed on, kissing, sharing utensils). Select one.    No, not that I know of   Not selected:    Yes, a confirmed case    Yes, a suspected case   When did your current symptoms start? If you know the exact date your symptoms started, choose Other and enter the month and day. Select one.    Other (specify): 4/3/2022   Not selected:    Less than 48 hours ago    3 to 5 days ago    6 to 9 days ago    10 to 14 days ago    2 to 4 weeks ago    More than a month ago   Did your symptoms come on suddenly or gradually? Select one.    Suddenly   Not selected:    Gradually    I'm not sure   You mentioned having a stuffy nose or sinus congestion. Do you feel pain or pressure in your sinuses?    No   Not selected:    Yes    I'm not sure   What color is your nasal drainage? Select one.    Green   Not selected:    Clear    White    Yellow    My nose is stuffed but not draining or running    I'm not sure   Is your nasal drainage thick or thin? Select " "one.    Thick   Not selected:    Thin    I'm not sure   Is there any drainage (mucus) going down the back of your throat? This kind of drainage is also called \"postnasal drip.\" Select one.    Yes   Not selected:    No    I'm not sure   Can you swallow liquids and solid foods? A sore throat may be painful when swallowing, but it shouldn't prevent you from swallowing. Select one.    Yes, with ease   Not selected:    Yes, but it's uncomfortable    Yes, but it's painful    It's hard to swallow anything because it feels like liquids and food get stuck in my throat    No, I can't swallow anything, liquid or solid foods   Since your symptoms started, have you felt dizzy? Select one.    No   Not selected:    Yes, but I can continue with my regular daily activities    Yes, and it makes it hard to stand, walk, or do daily activities   Do you have chest pain? You might also feel it as discomfort, aching, tightness, or squeezing in the chest. Select one.    No   Not selected:    Yes   Have you urinated at least 3 times in the last 24 hours? Select one.    Yes   Not selected:    No    I'm not sure   Changes in alertness or awareness may mean you need emergency care. Since your symptoms started, have you had any of these? Select all that apply.    None of the above   Not selected:    Confusion    Slurred speech    Not knowing where you are or what day it is    Difficulty staying conscious    Fainting or passing out   Do your symptoms include a whistling sound, or wheezing, when you breathe? Select one.    No   Not selected:    Yes    I'm not sure   Early in this interview, you told us you were hoarse or you'd lost your voice. How would you describe the changes to your voice? Select one.    It just sounds a little raspy   Not selected:    It's harder than usual to talk    I can barely talk at all   Is it possible that you strained your voice? Singing, yelling, or talking more or louder than usual can cause voice strain. Select " one.    No   Not selected:    Yes    I'm not sure   Do you have any of these symptoms in your ear(s)? Select all that apply.    None of the above   Not selected:    Pain    Pressure    Fullness    Crackling or popping    Plugged or blocked sensation   Can you move your chin toward your chest?    Yes   Not selected:    No, my neck is too stiff   Are your tonsils larger than usual?    I've had my tonsils removed   Not selected:    Yes    No    I'm not sure   Are there red spots on the roof of your mouth or the back of your throat?    No   Not selected:    Yes    I'm not sure   Are your glands/lymph nodes swollen, or does it hurt when you touch them?    No   Not selected:    Yes    I'm not sure   People with a very high body mass index (BMI) are at higher risk for developing complications from the flu and severe illness from COVID-19. To determine your BMI, we need to know your weight and height. Please enter your weight (in pounds).    Weight   Please enter your height.    Height   In the past 2 weeks, has anyone around you (such as at school, work, or home) had a confirmed diagnosis of strep throat? A confirmed diagnosis means that a throat swab and lab test were done to verify a strep throat infection. Select one.    No   Not selected:    Yes    I'm not sure   Do you think you might have strep throat? Select one.    No   Not selected:    Yes    I'm not sure   In the past week, has anyone around you (such as at school, work, or home) had a confirmed diagnosis of the flu? A confirmed diagnosis means that a nose swab was done to verify a flu infection. Select all that apply.    No   Not selected:    I live with someone who has the flu    I've been within touching distance of someone who has the flu    I've walked by, or sat about 3 feet away from, someone who has the flu    I've been in the same building as someone who has the flu    I'm not sure   Have you ever been diagnosed with asthma? Select one.    No   Not  selected:    Yes   Have you ever been prescribed albuterol to use for wheezing, cough, or shortness of breath caused by a cold, bronchitis, or pneumonia? Albuterol (ProAir, Proventil, Ventolin) is prescribed as an inhaler or a solution to be used with a nebulizer machine. Select one.    No   Not selected:    Yes    I'm not sure   Have you ever been prescribed a steroid inhaler to use for wheezing, cough, or shortness of breath caused by a cold, bronchitis, or pneumonia? Some examples of steroid inhalers include Pulmicort, Flovent, Qvar, and Alvesco. Select one.    No   Not selected:    Yes    I'm not sure   Have you ever been diagnosed with chronic obstructive pulmonary disease (COPD)? Select one.    No   Not selected:    Yes    I'm not sure   Do you have allergies (pollen, dust mites, mold, animal dander)? Select one.    No   Not selected:    Yes    I'm not sure   Have you had a flu shot this season? Select one.    No   Not selected:    Yes, less than 2 weeks ago    Yes, 2 to 4 weeks ago    Yes, 1 to 3 months ago    Yes, 3 to 6 months ago    Yes, more than 6 months ago    I'm not sure   The flu and COVID-19 can be more serious for people with certain conditions or characteristics. These questions help us figure out if you or anyone you live with is at higher risk for complications from these infections. Do either of these statements apply to you? Select all that apply.    None of the above   Not selected:    I'm  or Native [place]    I'm a healthcare worker   Do you smoke tobacco? Select one.    No, never   Not selected:    Yes, every day    Yes, some days    No, I quit   Some conditions can put you at risk for more serious infections. Do any of these apply to you? Select all that apply.    None of the above   Not selected:    I've been hospitalized within the last 5 days    I have diabetes    I'm in close contact with a child in    Are you currently being treated for any of these conditions?  Scroll to see all options. Select all that apply.    None of the above   Not selected:    Aspirin triad (also known as Samter's triad or ASA triad)    Asthma or hives from taking aspirin or other NSAIDs, such as ibuprofen or naproxen    Blockage or narrowing of the blood vessels of the heart    Blood dyscrasia, such anemia, leukemia, lymphoma, or myeloma    Bone marrow depression    Catecholamine-releasing paraganglioma    Blood clotting disorder    Congenital long QT syndrome    Depression    Difficulty urinating or completely emptying your bladder    Uncorrected electrolyte abnormalities    Fungal infection    Gastrointestinal (GI) bleeding    Gastrointestinal (GI) obstruction    G6PD deficiency    Recent heart attack    High blood pressure    Irregular heartbeat or heart rhythm    Kidney disease or hemodialysis    Mononucleosis (mono)    Myasthenia gravis    Parkinson's disease    Pheochromocytoma    Reye syndrome    Seizure disorder    Ulcerative colitis   Do you have any of these conditions that can affect the immune system? Scroll to see all options. Select all that apply.    None of these   Not selected:    History of bone marrow transplant    Chronic kidney disease    Chronic liver disease (including cirrhosis)    HIV/AIDS    Inflammatory bowel disease (Crohn's disease or ulcerative colitis)    Lupus    Moderate to severe plaque psoriasis    Multiple sclerosis    Rheumatoid arthritis    Sickle cell anemia    Alpha or beta thalassemia    History of solid organ transplant (kidney, liver, or heart)    History of spleen removal    An autoimmune disorder not listed here    A condition requiring treatment with long-term use of oral steroids (such as prednisone, prednisolone, or dexamethasone)   Have you ever been diagnosed with cancer? Select one.    No   Not selected:    Yes, I have cancer now    Yes, but I'm in remission   Have you ever had either of these conditions? Select all that apply.    No   Not  selected:    Metoclopramide-associated dystonic reaction    Tardive dyskinesia   Do any of these apply to the people who live with you? Select all that apply.    None of the above   Not selected:    A child under the age of 5    An adult 65 or older    A person who is pregnant    A person who has given birth, had a miscarriage, had a pregnancy loss, or had an  in the last 2 weeks    An  or Native [place]   Does any member of your household have any of these medical conditions? Select all that apply.    None of the above   Not selected:    Asthma    Disorders of the brain, spinal cord, or nerves and muscles, such as dementia, cerebral palsy, epilepsy, muscular dystrophy, or developmental delay    Chronic lung disease, such as COPD or cystic fibrosis    Heart disease, such as congenital heart disease, congestive heart failure, or coronary artery disease    Cerebrovascular disease, such as stroke or another condition affecting the blood vessels or blood supply to the brain    Blood disorders, such as sickle cell disease    Diabetes    Metabolic disorders such as inherited metabolic disorders or mitochondrial disease    Kidney disorders    Liver disorders    Weakened immune system due to illness or medications such as chemotherapy or steroids    Children under the age of 19 who are on long-term aspirin therapy    Extreme obesity (BMI > 40)   Have you gone through menopause? Select one.    No   Not selected:    Yes    I'm going through it now   Are you pregnant? Select one.    No   Not selected:    Yes   When was your last menstrual period? If you don't currently have periods or no longer have periods, please briefly explain.    No periods 10+ years, had an ablation.   Within the last 2 weeks, have you: - Given birth - Had a miscarriage - Had a pregnancy loss - Had an  Being postpartum (live birth or loss) within the last 2 weeks increases your risk of flu complications. Select one.    No    Not selected:    Yes   Are you breastfeeding? Select one.    No   Not selected:    Yes   Just a few more questions about medications, and then you're finished. Have you used any non-prescription medications or nasal sprays for your current symptoms? Examples include saline sprays, decongestants, NyQuil, and Tylenol. Select one.    Yes   Not selected:    No   Which of these non-prescription medications have you tried? Scroll to see all options. Select all that apply.    Acetaminophen (Tylenol)    Dextromethorphan (Delsym, Robitussin, Vicks DayQuil Cough)    Fluticasone (Flonase)    Guaifenesin/dextromethorphan (Delsym DM, Mucinex DM, Robitussin DM)    Ibuprofen (Advil, Motrin, Midol)   Not selected:    Budesonide (Rhinocort)    Cetirizine (Zyrtec)    Chlorpheniramine (Aller-chlor, Chlor-Trimeton)    Cromolyn (NasalCrom)    Diphenhydramine (Benadryl)    Fexofenadine (Allegra)    Guaifenesin (Mucinex)    Ketotifen (Alaway, Zaditor)    Loratadine (Alavert, Claritin)    Naphazoline-pheniramine (Naphcon-A, Opcon-A, Visine-A)    Omeprazole (Prilosec)    Oxymetazoline (Afrin)    Phenylephrine (Sudafed)    Triamcinolone (Nasacort)    None of the above   In the past month, have you taken antibiotics for similar symptoms? Examples of antibiotics include amoxicillin, amoxicillin-clavulanate (Augmentin), penicillin, cefdinir (Omnicef), doxycycline, and clindamycin (Cleocin). Select one.    No   Not selected:    Yes    I'm not sure   Have you taken any monoamine oxidase inhibitor (MAOI) medications in the last 14 days? Examples include rasagiline (Azilect), selegiline (Eldepryl, Zelapar), isocarboxazid (Marplan), phenelzine (Nardil), and tranylcypromine (Parnate). Select one.    No   Not selected:    Yes    I'm not sure   Do you take Kynmobi or Apokyn (apomorphine)? Select one.    No   Not selected:    Yes    I'm not sure   Are you taking any other medications or supplements? On the next screen, you need to list all vitamins,  "supplements, non-prescription medications (such as aspirin or Aleve), and prescription medications that you're taking. Select one.    No   Not selected:    Yes    Yes, but I'm not sure what they are   Have you ever had an allergic or bad reaction to any medication? Select one.    Yes   Not selected:    No   Have you had an allergic or bad reaction to any of these medications? Select all that apply.    None of the above   Not selected:    Baloxavir (Xofluza)    Benzonatate (Tessalon Perles)    Fluconazole, itraconazole, or terconazole (brands include Diflucan, Sporanox, Terazol)    Oseltamivir (Tamiflu) or zanamivir (Relenza)    I'm not sure   Have you had an allergic or bad reaction to any of these antibiotic medications? Select all that apply.    Penicillin or any \"-cillin\" antibiotic, such as amoxicillin, ampicillin, dicloxacillin, nafcillin, or piperacillin (Brands include Augmentin, Unasyn, and Zosyn)   Not selected:    Tetracycline or any \"-cycline\" antibiotic, such as doxycycline, demeclocycline, minocycline (Brands include Declomycin, Doryx, Dynacin, Oracea, Monodox, Panmycin, and Vibramycin)    Ciprofloxacin or any \"-floxacin\" antibiotic, such as gemifloxacin, levofloxacin, moxifloxacin, or ofloxacin (Brands include Factive, Cipro, Floxin, and Levaquin)    Cephalexin or any \"cef-\" antibiotic, such as cefazolin, cefdinir, cefuroxime, ceftriaxone, ceftazidime, or cefepime (Brands include Ancef, Ceftin, Fortaz, Keflex, Maxipime, Rocephin, and Simplicef)    Azithromycin or any \"-thromycin\" antibiotic, such as erythromycin or clarithromycin (Brands include Biaxin, Erythrocin, Z-haylie, and Zithromax)    Clindamycin or lincomycin (Brands include Cleocin and Lincocin)    None of the above    I'm not sure   Have you had an allergic or bad reaction to any of these medications? Select all that apply.    None of the above   Not selected:    Albuterol or a similar medication    Corticosteroid (steroid) medication, " including topical steroids, inhaled steroids, nasal steroids, or oral steroids (budesonide, ciclesonide, dexamethasone, flunisolide, fluticasone, methylprednisolone, triamcinolone, prednisone (or brand names Alvesco, Deltasone, Flovent, Medrol, Nasacort, Rhinocort, or Veramyst)    Metoclopramide (Reglan)    Ondansetron (Zuplenz, Zofran ODT, Zofran)    Prochlorperazine (Compazine)    I'm not sure   Have you had an allergic or bad reaction to any of these eye drops or nasal sprays? Scroll to see all options. Select all that apply.    None of the above   Not selected:    Azelastine (Astelin, Astepro, Optivar)    Cromolyn (Crolom, NasalCrom)    Ipratropium (Atrovent)    Ketotifen (Alaway, Zaditor)    Pheniramine/naphazoline (Naphcon-A, Opcon-A, Visine-A)    Olopatadine (Pataday, Patanol, Pazeo)    I'm not sure   Have you had an allergic or bad reaction to any of these non-prescription medications? Scroll to see all options. Select all that apply.    Diphenhydramine (Benadryl)   Not selected:    Acetaminophen (Tylenol)    Aspirin    Cetirizine (Zyrtec)    Chlorpheniramine (Aller-chlor, Chlor-Trimeton)    Dextromethorphan (Delsym, Robitussin, Vicks DayQuil Cough)    Fexofenadine (Allegra)    Guaifenesin (Mucinex)    Guaifenesin/dextromethorphan (Delsym DM, Mucinex DM, Robitussin DM)    Ibuprofen (Advil, Motrin, Midol)    Loratadine (Alavert, Claritin)    Oxymetazoline (Afrin)    Phenylephrine (Sudafed)    I'm not sure    None of the above   Are you allergic to milk or to the proteins found in milk (for example, whey or casein)? A milk allergy is different from lactose intolerance. Select one.    No   Not selected:    Yes    I'm not sure   Have you ever had jaundice or liver problems as a result of taking azithromycin (Zithromax, Zmax)? Jaundice is a condition in which the skin and the whites of the eyes turn yellow. Select all that apply.    No, not that I know of   Not selected:    Yes, jaundice    Yes, liver problems    Do you need a doctor's note? A doctor's note confirms that you received care today and states when you can return to school or work. It does not contain information about your diagnosis or treatment plan. Your provider will make the final decision on whether to give you a doctor's note and for how long. Doctor's notes CANNOT be backdated. We can't provide medical leave paperwork through this type of visit. If more paperwork is needed to request time off, contact your primary care provider. Select one.    No   Not selected:    Today only (1 day)    Today and tomorrow (2 days)    3 days    7 days    10 days    14 days   Is there anything else you'd like to tell us about your symptoms?    Last week, 4/4/22-4/6/22, I had flu-like symptoms in addition to this sinus issue. Ran an intermittent fever all 3 days (highest was 100.3). All other symptoms have passed other than this nasal drip, sore throat. Intermittent cough/blowing nose produces green/thick mucous. I believe it's a sinus infection and have taken z-paks in the past with no issues.   ----------   Medical history   Medical history data does not currently exist for this patient.

## 2022-05-01 ENCOUNTER — E-VISIT (OUTPATIENT)
Dept: FAMILY MEDICINE CLINIC | Facility: TELEHEALTH | Age: 52
End: 2022-05-01

## 2022-05-01 PROCEDURE — BRIGHTMDVISIT: Performed by: NURSE PRACTITIONER

## 2022-05-01 NOTE — EXTERNAL PATIENT INSTRUCTIONS
Note   Edvin Sebastian, If you suspect Covid 19 you can sign in for a Video Visit. With your prolonged symptoms i would recommend ruling this out. If you don't improve with the steroid haylie i would recommend follow up with your PCP ( provider) for an IN person exam. Cindy Munoz RAFFI   Diagnosis   Allergic rhinitis (allergies)   My name is Cindy Munoz, and I'm a healthcare provider at [facility]. I reviewed your interview and I see that you have allergic rhinitis, also known as seasonal allergies or hay fever. Allergic rhinitis is not an infection. It isn't caused by a virus or bacteria. Although allergy symptoms can be unpleasant, your symptoms aren't part of a more serious condition.   Based on what you told me in your interview, I haven't prescribed any antibiotics. Antibiotics won't help with allergies. The symptoms you reported suggest that you have allergies, not an infection:    An itchy nose or sneezing.    You've had symptoms for more than 10 days, but they've been improving. A bacterial infection is suspected only if symptoms have been around for longer than 10 days without improvement.    You don't have sinus pain. Sinus infections can cause sinus pain or pressure.    You don't have a sore throat. Bacterial infections like strep throat cause a severe sore throat.   Medications   Your pharmacy   [facility][id number] [address] [id number] [phone]     Prescription   Methylprednisolone (4mg): On day 1, take 2 tablets by mouth before breakfast, 1 tablet by mouth after lunch, 1 tablet by mouth after dinner, and 2 tablets by mouth at bedtime. On day 2, take 1 tablet by mouth before breakfast, 1 tablet by mouth after lunch, 1 tablet by mouth after dinner, and 2 tablets by mouth at bedtime. On day 3, take 1 tablet by mouth before breakfast, 1 tablet by mouth after lunch, 1 tablet by mouth after dinner, and 1 tablet by mouth at bedtime. On day 4, take 1 tablet by mouth  before breakfast, 1 tablet by mouth after lunch, and 1 tablet by mouth at bedtime. On day 5, take 1 tablet by mouth before breakfast and 1 tablet by mouth at bedtime. On day 6, take 1 tablet by mouth before breakfast.   Ipratropium bromide 21 mcg nasal spray (0.03%): Spray 2 sprays in each nostril 3 times a day as needed for allergies.   Non-prescription   Loratadine (10mg): Take 1 tablet by mouth daily as needed for allergies. Brands to look for include Claritin.   Budesonide (32mcg): Spray 2 times in each nostril twice a day for nasal symptoms due to allergies or sinusitis. Budesonide needs to be used every day to be effective. It may take up to a week for the full effects of the medication to be seen. Brands to look for include Rhinocort.   About your diagnosis   Allergic rhinitis, also called allergies or hay fever, is very common. Symptoms can seem similar to a cold, but they're caused by an allergen, not a virus. These are the key things to know about allergies:    The best way to treat allergies is to avoid the cause of your symptoms. Medications can also help your symptoms.    Symptoms range in severity. They may be only mildly annoying or they may seriously affect day-to-day life.    Symptoms can be seasonal, triggered by tree pollen, grasses, and weeds. Mold, dust, or pet dander can cause allergy symptoms in any season.   In addition to symptoms affecting the nose and eyes, allergies can cause fatigue and irritability.   What to expect   Despite the unpleasant symptoms, you should feel better soon. Follow the treatment plan provided here.   When to seek care   Call us at [phone]   with any sudden or unexpected symptoms.    Severe allergy symptoms.    Your normal allergy medications stop working or cause uncomfortable side effects.    Sinus pain that lasts for more than 10 days, without improvement.    Severe chest pain.   Other treatment   If possible, avoid the allergens that trigger your allergy  symptoms. If you normally use medications, inhalers, or an asthma action plan, continue using them as prescribed.   Prevention   Allergy medications work best if you take them before your symptoms develop.   Avoid smoke and air pollution. Smoke can make infections worse.    Flu vaccine information   Who should get a flu vaccine?   Everyone 6 months of age and older should get a yearly flu vaccine.   When should I get vaccinated?   You should get a flu vaccine by the end of October. Once you're vaccinated, it takes about two weeks for antibodies to develop and protect you against the flu. That's why it's important to get vaccinated as soon as possible.   After October, is it too late to get vaccinated?   No. You should still get vaccinated. As long as the flu viruses are still in your community, flu vaccines will remain available, even into January of next year or later.   Why do I need a flu vaccine EVERY year?   Flu viruses are constantly changing, so flu vaccines are usually updated from one season to the next. Your protection from the flu vaccine also lessens over time.   Is the flu vaccine safe?   Yes. Over the last 50 years, hundreds of millions of Americans have safely received the flu vaccines.   What are the side effects of flu vaccines?   You CANNOT get the flu from a flu vaccine. Common side effects of the flu shot include soreness, redness and/or swelling where the shot was given, low grade fever, and aches. Common side effects of the nasal spray flu vaccine for adults include runny nose, headaches, sore throat, and cough. For children, side effects include wheezing, vomiting, muscle aches, and fever.   Does the flu vaccine increase your risk of getting COVID-19?   No. There is no evidence that getting a flu vaccine increases your risk of getting COVID-19.   Is it safe to get the flu vaccine along with a COVID-19 vaccine?   Yes. It's safe to get the flu vaccine with a COVID-19 vaccine or booster.    Contact your healthcare provider TODAY for details on when and where to get your flu vaccine.    Coronavirus (COVID-19) information   Common symptoms of COVID-19 include fever, cough, shortness of breath, fatigue, muscle or body aches, headaches, new loss of sense of taste or smell, sore throat, stuffy or runny nose, nausea or vomiting, and diarrhea. Most people who get COVID-19 have mild symptoms and can rest at home until they get better. Elderly people and those with chronic medical problems may be at risk for more serious complications.   FAQs about the COVID-19 vaccine   There are three authorized COVID-19 vaccines: Bird & Bird's Itz Vaccine (J&J/Itz), Moderna, and Pfizer-BioNTech (Pfizer). The J&J/Itz and Moderna vaccines are approved for use in people aged 18 and older. The Pfizer vaccine is approved for those aged 5 and older. All three are available at no cost. Even if you don't have health insurance, you can still get the COVID-19 vaccine for free.   Which vaccine is the best? Which vaccine should I get?   All three vaccines are highly effective. Even if you get COVID-19 after being vaccinated, all of the vaccines help prevent severe disease, hospitalization, and complications.   Most people should get whichever vaccine is first available to them. However, women younger than 50 years old should consider the rare risk of blood clots with low platelets after vaccination with the J&J/Itz vaccine. This risk hasn't been seen with the other two vaccines.   Are the vaccines safe?   Yes. Hundreds of millions of people in the [place] have already safely received COVID-19 vaccines. As part of Phase 3 clinical trials in the [place] and other countries, researchers collected safety and efficacy data for all three vaccines. These clinical trials follow strict standards. Before a vaccine is approved, the manufacturing company must submit data to the Food and Drug Administration (FDA) for review. Tens of  thousands of volunteers participated in the clinical trials for the vaccines. The FDA continues to monitor safety data as the vaccines are given to the general population.   Do I need the vaccine if I've already had COVID?   Yes. Vaccination helps protect you even if you've already had COVID.   If you had COVID-19 and had symptoms, wait to get vaccinated until ALL of the following are true:    5 full days since your symptoms started    24 hours with no fever, without the use of fever-reducing medications    Your other COVID-19 symptoms are improving   If you tested positive for COVID-19 but did not have symptoms, you can get vaccinated after 5 full days have passed since you had a positive test, as long as you don't develop symptoms.   If you had COVID-19 and were treated with monoclonal antibodies, you should wait 90 days before getting a COVID-19 vaccination.   How many doses of the vaccine do I need?   Visit https://www.cdc.gov/coronavirus/2019-ncov/vaccines/stay-up-to-date.html   to find out how to stay up to date with your COVID-19 vaccines.   I'm immunocompromised. How many doses of the vaccine do I need?   For information on how immunocompromised people can stay up to date with their COVID-19 vaccines, visit https://www.cdc.gov/coronavirus/2019-ncov/vaccines/recommendations/immuno.html  .   What are the common side effects of the vaccine?   A sore arm, tiredness, headache, and muscle pain may occur within two days of getting the vaccine and last a day or two. For the Moderna or Pfizer vaccines, side effects are more common after the second dose. People over the age of 55 are less likely to have side effects than younger people.   After I'm up to date on vaccines, can I still get or spread COVID?   Yes, but your disease should be milder. And your risk of serious illness, hospitalization, and complications will be much lower, especially if you're up to date. Being vaccinated reduces the risk of spreading the  disease if you get it.   After I'm up to date on vaccines, can I go back to normal?   You should still wear a mask indoors in public if:    It's required by laws, rules, regulations, or local guidance.    You have a weakened immune system.    Your age puts you at increased risk of severe disease.    You have a medical condition that puts you at increased risk of severe disease.    Someone in your household has a weakened immune system, is at increased risk for severe disease, or is unvaccinated.    You're in an area of high transmission.   Where can I get a COVID-19 vaccine?   Visit [facility]'s website for more information. To find a COVID-19 vaccination site near you, visit https://www.vaccines.gov/  , call [phone]  , or text your zip code to [id number] ([employer]). Message and data rates may apply.   What about travel?   For information before your trip, see https://www.cdc.gov/coronavirus/2019-ncov/travelers/index.html  . Travel increases your risk of exposure to COVID-19. For information on what to do after you've returned, see https://www.cdc.gov/coronavirus/2019-ncov/php/risk-assessment.html  .   I've had close contact with someone who has COVID. Do I need to quarantine, and if so, for how long?   For the most current answer, including a calculator to determine whether you need to stay home and for how long, visit https://www.cdc.gov/coronavirus/2019-ncov/your-health/quarantine-isolation.html  .   I've had a positive test result for COVID. How long do I need to isolate?   For the latest recommendations, including a calculator to determine how long you need to stay home, visit https://www.cdc.gov/coronavirus/2019-ncov/your-health/quarantine-isolation.html  .   What if I develop symptoms that might be from COVID?   For the latest recommendations on what to do if you're sick, including when to seek emergency care, visit https://www.cdc.gov/coronavirus/2019-ncov/if-you-are-sick/steps-when-sick.html  .   Your  provider   Your diagnosis was provided by Cindy Munoz, a member of your trusted care team at [facility].   If you have any questions, call us at [phone]  .

## 2022-05-01 NOTE — E-VISIT TREATED
Chief Complaint: Coronavirus (COVID-19), cold, sinus pain, allergy, or flu   Patient introduction   Patient is 52-year-old female who reports cough, congestion, itchy nose or sneezing, and voice hoarseness that started 2-4 weeks ago.   When asked why they're seeking care online today, patient reports they want to know if they have a cold or something more serious.   Patient has not requested COVID testing.   COVID-19 exposure, testing history, and vaccination status:    No known exposure to a confirmed or suspected case of COVID-19.    No recent travel outside of their local community.    Patient had a viral lab test 15-30 days ago. Test result was negative.    Reports receiving 2 doses of the COVID-19 vaccine (Pfizer, Pfizer). Received their most recent dose of the vaccine > 14 days ago.   Risk factors for severe disease from COVID-19 infection:    BMI >= 25   Patient-submitted comments: Hello, a week ago I finished a 10 day dose of doxycycline. Symptoms improved but can't get rid of cough, sneezing and drainage. Cough has been so bad I hurt in my muscles on left side/back area. Have been sick going on 3 weeks now..   Patient did not request an excuse note.   General presentation   Patient reports improvement of symptoms. Symptoms came on suddenly.   Fever:    Denies fever.   Sinus and nasal symptoms:    Reports itchy nose or sneezing.    Reports clear nasal drainage.    Nasal drainage is thick.    Reports congestion without associated pain or pressure.    Denies rhinitis.    Denies postnasal drip.   Sore throat:    Reports mild hoarseness. Patient doesn't believe hoarseness is due to voice strain.    Denies sore throat.   Head and body aches:    Denies headache.    Denies sweats.    Denies chills.    Denies myalgia.    Denies fatigue.   Cough:    Reports cough.    Cough is worse in the morning, during the day, and at night/while sleeping.    Cough is mildly productive of sputum.    Describes color of mucus as  clear.   Wheezing and SOB:    Denies COPD diagnosis.    Denies asthma diagnosis.    Denies wheezing.    Denies shortness of breath.    Denies previous albuterol inhaler use during URIs, bronchitis, or pneumonia.    Denies previous steroid inhaler use during URIs, bronchitis, or pneumonia.   Chest pain:    Denies chest pain.   Dizziness:    Denies dizziness.   Flu exposure:    Denies recent exposure to confirmed flu diagnosis.    Denies receiving a flu vaccine this season.   Patient denies the following red flags:    Changes in alertness or awareness    Decreased urination   Risk factors for antibiotic resistance:    Antibiotic use for similar symptoms within the last 30 days   Pregnancy/menstrual status/breastfeeding:    Denies being pregnant    Denies breastfeeding    Regarding last menstrual period, patient writes: I don't have one, had an ablation 10+ yrs ago   Self-exam:    No difficulty moving their chin toward their chest    Neck lymph nodes feel normal    Reports antibiotic treatment for similar symptoms within the past month   Current medications   Reports taking over-the-counter medication for current symptoms. Patient has taken acetaminophen, cetirizine, dextromethorphan, fluticasone, guaifenesin/dextromethorphan, loratadine, and triamcinolone.   Denies taking other medications or supplements.   Medication allergies    Diphenhydramine    Penicillins   Medication contraindication review   Denies history of anaphylactic reaction to beta-lactam antibiotics; aspirin triad; blood dyscrasia; bone marrow depression; catecholamine-releasing paraganglioma; coronary artery disease; coagulation disorder; congenital long QT syndrome; depression; electrolyte abnormalities; fungal infection; GI bleeding; GI obstruction; G6PD deficiency; heart arrhythmia; hypertension; kidney disease or hemodialysis; mononucleosis; myasthenia; recent myocardial infarction; NSAID-induced asthma/urticaria; Parkinson's disease;  pheochromocytoma; porphyria; Reye syndrome; seizure disorder; ulcerative colitis; and urinary retention.   Denies history of metoclopramide-associated dystonic reaction and tardive dyskinesia.   No known history of azithromycin-associated cholestatic jaundice or hepatic impairment.   Past medical history   Immune conditions: Denies immunocompromising conditions. Denies history of cancer.   Social history   Non-smoker.   Assessment   Allergic rhinitis. Ruled out: Traumatic laryngitis.   This is the likely diagnosis based on patient's interview responses and symptoms reported, including:    Congestion    Cough    Itchy nose or sneezing    Duration of symptoms > 2 weeks   HHS required information for COVID-19 lab data reporting (if test is ordered):   Symptoms:    Cough    Nasal congestion   Symptom onset: 2-4 weeks ago ago  Pregnancy: No or N/A  Healthcare worker? No  Resident in a congregate care setting? No  Previous history of COVID-19 testing: Patient had a viral lab test 15-30 days ago. Test result was negative.     Plan   Medications:    methylprednisolone 4 mg tablets in a dose pack RX 4mg 1 tab PO qid 6d for allergies. On day 1, take 2 tablets by mouth before breakfast, 1 tablet by mouth after lunch, 1 tablet by mouth after dinner, and 2 tablets by mouth at bedtime. On day 2, take 1 tablet by mouth before breakfast, 1 tablet by mouth after lunch, 1 tablet by mouth after dinner, and 2 tablets by mouth at bedtime. On day 3, take 1 tablet by mouth before breakfast, 1 tablet by mouth after lunch, 1 tablet by mouth after dinner, and 1 tablet by mouth at bedtime. On day 4, take 1 tablet by mouth before breakfast, 1 tablet by mouth after lunch, and 1 tablet by mouth at bedtime. On day 5, take 1 tablet by mouth before breakfast and 1 tablet by mouth at bedtime. On day 6, take 1 tablet by mouth before breakfast. Amount is 21 tab.    ipratropium bromide 21 mcg (0.03 %) nasal spray RX 0.03% 2 sprays nasal tid PRN 30d for  allergies. Amount is 30 mL.    loratadine 10 mg tablet OTC 10mg 1 tab PO qd PRN 30d for allergies. Brands to look for include Claritin. Amount is 30 tab.    budesonide 32 mcg/actuation nasal spray OTC 32mcg 2 sprays nasal bid 30d for nasal symptoms due to allergies or sinusitis. Budesonide needs to be used every day to be effective. It may take up to a week for the full effects of the medication to be seen. Brands to look for include Rhinocort. Amount is 120 mL.   The patient's prescriptions will be sent to:   [id number]   [id number] [id number]   Phone: [phone]     Fax: [phone]   Patient informed to purchase OTC medications.   Education:    Condition and causes    Prevention    Treatment and self-care    When to call provider   ----------   Electronically signed by RAFFI Nguyen on 2022-05-01 at 12:13PM   ----------   Patient Interview Transcript:   Why are you getting care through eVisit today? We can't guarantee a specific treatment or test. Your provider will decide what's best for you. Select all that apply.    I want to know if I have a cold or something more serious   Not selected:    I want to know if I need to be seen by a provider    I need a doctor's note    I want to be tested for COVID-19    I want to get the COVID-19 vaccine    I think I'm having side effects from the COVID-19 vaccine    I just want to feel better!    None of the above   COVID-19 symptoms are similar to symptoms of other illnesses. This makes it difficult to diagnose. Please carefully consider each question and answer as best you can. This will help your provider make the right diagnosis. Which of these symptoms are bothering you? Select all that apply.    Cough    Stuffed-up nose or sinuses    Itchy nose or sneezing    Hoarse voice or loss of voice   Not selected:    Shortness of breath    Fever    Runny nose    Itchy or watery eyes    Loss of smell or taste    Sore  throat    Headache    Sweats    Chills    Muscle or body aches    Fatigue or tiredness    Nausea or vomiting    Diarrhea    I don't have any of these symptoms   Do you have any of these conditions? These conditions can put you at increased risk for severe disease if you're infected with COVID-19. Select all that apply.    None of the above   Not selected:    Chronic lung disease, such as cystic fibrosis or interstitial fibrosis    Heart disease, such as congenital heart disease, congestive heart failure, or coronary artery disease    Disorder of the brain, spinal cord, or nerves and muscles, such as dementia, cerebral palsy, epilepsy, muscular dystrophy, or developmental delay    Metabolic disorder or mitochondrial disease    Cerebrovascular disease, such as stroke or another condition affecting the blood vessels or blood supply to the brain    Down syndrome    Mood disorder, including depression or schizophrenia spectrum disorders    Substance use disorder, such as alcohol, opioid, or cocaine use disorder    Tuberculosis   Do you live in a group care setting? Examples include: - Nursing home - Residential care - Psychiatric treatment facility - [facility] - Homeless shelter - Foster care setting Select one.    No   Not selected:    Yes   Have you ever been tested for COVID-19? Select one.    Yes   Not selected:    No   When was your most recent COVID-19 test? Select one.    15 to 30 days ago   Not selected:    Within the last week (specify date in MM/DD/YY format)    7 to 14 days ago    1 to 3 months ago    More than 3 months ago   What type of COVID-19 test did you most recently have? There are two types of COVID-19 tests: - Viral tests check if you're currently infected with COVID-19. For these tests, a nose swab or saliva sample is taken. Viral tests include self-tests and tests done at a doctor's office, lab, or testing site. - Antibody tests check if you've been infected in  "the past. For these tests, your blood is drawn. Antibody tests can only be done at a doctor's office, lab, or testing site. Select one.    Viral test at a doctor's office, lab, or testing site   Not selected:    Viral self-test    Antibody test   What was the result of your most recent COVID-19 test? Select one.    Negative   Not selected:    Positive    I'm not sure   Have you gotten the COVID-19 vaccine? Select one.    Yes   Not selected:    No   How many doses of the COVID-19 vaccine have you gotten? This includes boosters as well as third or fourth doses for those who are immunocompromised. Select one.    2 doses   Not selected:    1 dose    3 doses    4 doses   Which COVID-19 vaccine did you get for your first dose? Check your Vaccination Record Card under Product Name/. Select one.    Pfizer-BioNTech (Pfizer)   Not selected:    Bird & Bird's Itz Vaccine (J&J/Itz)    Moderna   Which COVID-19 vaccine did you get for your second dose? Check your Vaccination Record Card under Product Name/. Select one.    Pfizer-BioNTech (Pfizer)   Not selected:    Bird & Bird's Itz Vaccine (J&J/Itz)    Moderna   When did you get your most recent dose of the COVID-19 vaccine?    More than 14 days ago   Not selected:    Less than 48 hours (2 days) ago    48 to 72 hours (3 days) ago    3 to 5 days ago    5 to 7 days ago    7 to 14 days ago   In the last 14 days, have you traveled outside of your local community? This includes travel by car, RV, bus, train, or plane. Travel increases your chances of getting and spreading COVID-19. Select one.    No   Not selected:    Yes   In the last 14 days, have you had close contact with someone who has coronavirus (COVID-19)? \"Close contact\" means any of these: - Living in the same household as someone with COVID-19. - Caring for someone with COVID-19. - Being within 6 feet of someone with COVID-19 for a total of at least 15 minutes over a " 24-hour period. For example, three 5-minute exposures for a total of 15 minutes. - Being in direct contact with respiratory droplets from someone with COVID-19 (being coughed on, kissing, sharing utensils). Select one.    No, not that I know of   Not selected:    Yes, a confirmed case    Yes, a suspected case   When did your current symptoms start? Select one.    2 to 4 weeks ago   Not selected:    Less than 48 hours ago    3 to 5 days ago    6 to 9 days ago    10 to 14 days ago    More than a month ago   Did your symptoms come on suddenly or gradually? Select one.    Suddenly   Not selected:    Gradually    I'm not sure   Have your symptoms improved at all since they began? Select one.    Yes, but they haven't gone away completely   Not selected:    Yes, but then they came back worse than before    No    I'm not sure   Do you cough so hard that it's made you gag or vomit? By gag, we mean has your coughing made you choke or dry heave? Select all that apply.    Yes, my coughing has made me gag   Not selected:    Yes, my coughing has made me vomit    No   When is your cough the worst? Select all that apply.    In the morning, or when I wake up    During the day    At nighttime, or while I'm sleeping   Not selected:    I'm not sure   Are you coughing up mucus or phlegm? Select one.    Yes, a little   Not selected:    No, my cough is dry    Yes, a lot   What color is most of the mucus or phlegm that you're coughing up? Select one.    Clear   Not selected:    White/frothy    Yellow    Green    Red or pink    I'm not sure   You mentioned having a stuffy nose or sinus congestion. Do you feel pain or pressure in your sinuses?    No   Not selected:    Yes    I'm not sure   What color is your nasal drainage? Select one.    Clear   Not selected:    White    Yellow    Green    My nose is stuffed but not draining or running    I'm not sure   Is your nasal drainage thick or thin? Select one.    Thick   Not selected:    Thin    " I'm not sure   Is there any drainage (mucus) going down the back of your throat? This kind of drainage is also called \"postnasal drip.\" Select one.    No   Not selected:    Yes    I'm not sure   Since your symptoms started, have you felt dizzy? Select one.    No   Not selected:    Yes, but I can continue with my regular daily activities    Yes, and it makes it hard to stand, walk, or do daily activities   Do you have chest pain? You might also feel it as discomfort, aching, tightness, or squeezing in the chest. Select one.    No   Not selected:    Yes   Have you urinated at least 3 times in the last 24 hours? Select one.    Yes   Not selected:    No    I'm not sure   Changes in alertness or awareness may mean you need emergency care. Since your symptoms started, have you had any of these? Select all that apply.    None of the above   Not selected:    Confusion    Slurred speech    Not knowing where you are or what day it is    Difficulty staying conscious    Fainting or passing out   Do your symptoms include a whistling sound, or wheezing, when you breathe? Select one.    No   Not selected:    Yes    I'm not sure   Early in this interview, you told us you were hoarse or you'd lost your voice. How would you describe the changes to your voice? Select one.    It just sounds a little raspy   Not selected:    It's harder than usual to talk    I can barely talk at all   Is it possible that you strained your voice? Singing, yelling, or talking more or louder than usual can cause voice strain. Select one.    No   Not selected:    Yes    I'm not sure   Do you have any of these symptoms in your ear(s)? Select all that apply.    None of the above   Not selected:    Pain    Pressure    Fullness    Crackling or popping    Plugged or blocked sensation   Can you move your chin toward your chest?    Yes   Not selected:    No, my neck is too stiff   Are your glands/lymph nodes swollen, or does it hurt when you touch them?    No   " Not selected:    Yes    I'm not sure   People with a very high body mass index (BMI) are at higher risk for developing complications from the flu and severe illness from COVID-19. To determine your BMI, we need to know your weight and height. Please enter your weight (in pounds).    Weight   Please enter your height.    Height   In the past week, has anyone around you (such as at school, work, or home) had a confirmed diagnosis of the flu? A confirmed diagnosis means that a nose swab was done to verify a flu infection. Select all that apply.    No   Not selected:    I live with someone who has the flu    I've been within touching distance of someone who has the flu    I've walked by, or sat about 3 feet away from, someone who has the flu    I've been in the same building as someone who has the flu    I'm not sure   Have you ever been diagnosed with asthma? Select one.    No   Not selected:    Yes   Have you ever been prescribed albuterol to use for wheezing, cough, or shortness of breath caused by a cold, bronchitis, or pneumonia? Albuterol (ProAir, Proventil, Ventolin) is prescribed as an inhaler or a solution to be used with a nebulizer machine. Select one.    No   Not selected:    Yes    I'm not sure   Have you ever been prescribed a steroid inhaler to use for wheezing, cough, or shortness of breath caused by a cold, bronchitis, or pneumonia? Some examples of steroid inhalers include Pulmicort, Flovent, Qvar, and Alvesco. Select one.    No   Not selected:    Yes    I'm not sure   Have you ever been diagnosed with chronic obstructive pulmonary disease (COPD)? Select one.    No   Not selected:    Yes    I'm not sure   Do you have allergies (pollen, dust mites, mold, animal dander)? Select one.    I'm not sure   Not selected:    Yes    No   Do you think your symptoms could be allergy-related? Select one.    I'm not sure   Not selected:    Yes    No   Have you had a flu shot this season? Select one.    No   Not  selected:    Yes, less than 2 weeks ago    Yes, 2 to 4 weeks ago    Yes, 1 to 3 months ago    Yes, 3 to 6 months ago    Yes, more than 6 months ago    I'm not sure   The flu and COVID-19 can be more serious for people with certain conditions or characteristics. These questions help us figure out if you or anyone you live with is at higher risk for complications from these infections. Do either of these statements apply to you? Select all that apply.    None of the above   Not selected:    I'm  or Native [place]    I'm a healthcare worker   Do you smoke tobacco? Select one.    No, never   Not selected:    Yes, every day    Yes, some days    No, I quit   Some conditions can put you at risk for more serious infections. Do any of these apply to you? Select all that apply.    None of the above   Not selected:    I've been hospitalized within the last 5 days    I have diabetes    I'm in close contact with a child in    Are you currently being treated for any of these conditions? Scroll to see all options. Select all that apply.    None of the above   Not selected:    Aspirin triad (also known as Samter's triad or ASA triad)    Asthma or hives from taking aspirin or other NSAIDs, such as ibuprofen or naproxen    Blockage or narrowing of the blood vessels of the heart    Blood dyscrasia, such anemia, leukemia, lymphoma, or myeloma    Bone marrow depression    Catecholamine-releasing paraganglioma    Blood clotting disorder    Congenital long QT syndrome    Depression    Difficulty urinating or completely emptying your bladder    Uncorrected electrolyte abnormalities    Fungal infection    Gastrointestinal (GI) bleeding    Gastrointestinal (GI) obstruction    G6PD deficiency    Recent heart attack    High blood pressure    Irregular heartbeat or heart rhythm    Kidney disease or hemodialysis    Mononucleosis (mono)    Myasthenia gravis    Parkinson's disease    Pheochromocytoma    Reye syndrome     Seizure disorder    Ulcerative colitis   Do you have any of these conditions that can affect the immune system? Scroll to see all options. Select all that apply.    None of these   Not selected:    History of bone marrow transplant    Chronic kidney disease    Chronic liver disease (including cirrhosis)    HIV/AIDS    Inflammatory bowel disease (Crohn's disease or ulcerative colitis)    Lupus    Moderate to severe plaque psoriasis    Multiple sclerosis    Rheumatoid arthritis    Sickle cell anemia    Alpha or beta thalassemia    History of solid organ transplant (kidney, liver, or heart)    History of spleen removal    An autoimmune disorder not listed here    A condition requiring treatment with long-term use of oral steroids (such as prednisone, prednisolone, or dexamethasone)   Have you ever been diagnosed with cancer? Select one.    No   Not selected:    Yes, I have cancer now    Yes, but I'm in remission   Have you ever had either of these conditions? Select all that apply.    No   Not selected:    Metoclopramide-associated dystonic reaction    Tardive dyskinesia   Do any of these apply to the people who live with you? Select all that apply.    None of the above   Not selected:    A child under the age of 5    An adult 65 or older    A person who is pregnant    A person who has given birth, had a miscarriage, had a pregnancy loss, or had an  in the last 2 weeks    An  or Native [place]   Does any member of your household have any of these medical conditions? Select all that apply.    None of the above   Not selected:    Asthma    Disorders of the brain, spinal cord, or nerves and muscles, such as dementia, cerebral palsy, epilepsy, muscular dystrophy, or developmental delay    Chronic lung disease, such as COPD or cystic fibrosis    Heart disease, such as congenital heart disease, congestive heart failure, or coronary artery disease    Cerebrovascular disease, such as stroke or another  condition affecting the blood vessels or blood supply to the brain    Blood disorders, such as sickle cell disease    Diabetes    Metabolic disorders such as inherited metabolic disorders or mitochondrial disease    Kidney disorders    Liver disorders    Weakened immune system due to illness or medications such as chemotherapy or steroids    Children under the age of 19 who are on long-term aspirin therapy    Extreme obesity (BMI > 40)   Have you gone through menopause? Select one.    No   Not selected:    Yes    I'm going through it now   Are you pregnant? Select one.    No   Not selected:    Yes   When was your last menstrual period? If you don't currently have periods or no longer have periods, please briefly explain.    I don't have one, had an ablation 10+ yrs ago   Within the last 2 weeks, have you: - Given birth - Had a miscarriage - Had a pregnancy loss - Had an  Being postpartum (live birth or loss) within the last 2 weeks increases your risk of flu complications. Select one.    No   Not selected:    Yes   Are you breastfeeding? Select one.    No   Not selected:    Yes   Just a few more questions about medications, and then you're finished. Have you used any non-prescription medications or nasal sprays for your current symptoms? Examples include saline sprays, decongestants, NyQuil, and Tylenol. Select one.    Yes   Not selected:    No   Which of these non-prescription medications have you tried? Scroll to see all options. Select all that apply.    Acetaminophen (Tylenol)    Cetirizine (Zyrtec)    Dextromethorphan (Delsym, Robitussin, Vicks DayQuil Cough)    Fluticasone (Flonase)    Guaifenesin/dextromethorphan (Delsym DM, Mucinex DM, Robitussin DM)    Loratadine (Alavert, Claritin)    Triamcinolone (Nasacort)   Not selected:    Budesonide (Rhinocort)    Chlorpheniramine (Aller-chlor, Chlor-Trimeton)    Cromolyn (NasalCrom)    Diphenhydramine (Benadryl)    Fexofenadine (Allegra)    Guaifenesin  "(Mucinex)    Ibuprofen (Advil, Motrin, Midol)    Ketotifen (Alaway, Zaditor)    Naphazoline-pheniramine (Naphcon-A, Opcon-A, Visine-A)    Omeprazole (Prilosec)    Oxymetazoline (Afrin)    Phenylephrine (Sudafed)    None of the above   In the past month, have you taken antibiotics for similar symptoms? Examples of antibiotics include amoxicillin, amoxicillin-clavulanate (Augmentin), penicillin, cefdinir (Omnicef), doxycycline, and clindamycin (Cleocin). Select one.    Yes   Not selected:    No    I'm not sure   Have you taken any monoamine oxidase inhibitor (MAOI) medications in the last 14 days? Examples include rasagiline (Azilect), selegiline (Eldepryl, Zelapar), isocarboxazid (Marplan), phenelzine (Nardil), and tranylcypromine (Parnate). Select one.    No   Not selected:    Yes    I'm not sure   Do you take Kynmobi or Apokyn (apomorphine)? Select one.    No   Not selected:    Yes    I'm not sure   Are you taking any other medications or supplements? On the next screen, you need to list all vitamins, supplements, non-prescription medications (such as aspirin or Aleve), and prescription medications that you're taking. Select one.    No   Not selected:    Yes    Yes, but I'm not sure what they are   Have you ever had an allergic or bad reaction to any medication? Select one.    Yes   Not selected:    No   Have you had an allergic or bad reaction to any of these medications? Select all that apply.    No, not that I know of   Not selected:    Baloxavir (Xofluza)    Benzonatate (Tessalon Perles)    Fluconazole, itraconazole, or terconazole (brands include Diflucan, Sporanox, Terazol)    Oseltamivir (Tamiflu) or zanamivir (Relenza)    Paxlovid, nirmatrelvir, or ritonavir (Norvir)   Have you had an allergic or bad reaction to any of these antibiotic medications? Select all that apply.    Penicillin or any \"-cillin\" antibiotic, such as amoxicillin, ampicillin, dicloxacillin, nafcillin, or piperacillin (Brands include " "Augmentin, Unasyn, and Zosyn)   Not selected:    Tetracycline or any \"-cycline\" antibiotic, such as doxycycline, demeclocycline, minocycline (Brands include Declomycin, Doryx, Dynacin, Oracea, Monodox, Panmycin, and Vibramycin)    Ciprofloxacin or any \"-floxacin\" antibiotic, such as gemifloxacin, levofloxacin, moxifloxacin, or ofloxacin (Brands include Factive, Cipro, Floxin, and Levaquin)    Cephalexin or any \"cef-\" antibiotic, such as cefazolin, cefdinir, cefuroxime, ceftriaxone, ceftazidime, or cefepime (Brands include Ancef, Ceftin, Fortaz, Keflex, Maxipime, Rocephin, and Simplicef)    Azithromycin or any \"-thromycin\" antibiotic, such as erythromycin or clarithromycin (Brands include Biaxin, Erythrocin, Z-haylie, and Zithromax)    Clindamycin or lincomycin (Brands include Cleocin and Lincocin)    None of the above    I'm not sure   Have you had an allergic or bad reaction to any of these medications? Select all that apply.    None of the above   Not selected:    Albuterol or a similar medication    Corticosteroid (steroid) medication, including topical steroids, inhaled steroids, nasal steroids, or oral steroids (budesonide, ciclesonide, dexamethasone, flunisolide, fluticasone, methylprednisolone, triamcinolone, prednisone (or brand names Alvesco, Deltasone, Flovent, Medrol, Nasacort, Rhinocort, or Veramyst)    Metoclopramide (Reglan)    Ondansetron (Zuplenz, Zofran ODT, Zofran)    Prochlorperazine (Compazine)    I'm not sure   Have you had an allergic or bad reaction to any of these eye drops or nasal sprays? Scroll to see all options. Select all that apply.    None of the above   Not selected:    Azelastine (Astelin, Astepro, Optivar)    Cromolyn (Crolom, NasalCrom)    Ipratropium (Atrovent)    Ketotifen (Alaway, Zaditor)    Pheniramine/naphazoline (Naphcon-A, Opcon-A, Visine-A)    Olopatadine (Pataday, Patanol, Pazeo)    I'm not sure   Have you had an allergic or bad reaction to any of these non-prescription " medications? Scroll to see all options. Select all that apply.    Diphenhydramine (Benadryl)   Not selected:    Acetaminophen (Tylenol)    Aspirin    Cetirizine (Zyrtec)    Chlorpheniramine (Aller-chlor, Chlor-Trimeton)    Dextromethorphan (Delsym, Robitussin, Vicks DayQuil Cough)    Fexofenadine (Allegra)    Guaifenesin (Mucinex)    Guaifenesin/dextromethorphan (Delsym DM, Mucinex DM, Robitussin DM)    Ibuprofen (Advil, Motrin, Midol)    Loratadine (Alavert, Claritin)    Oxymetazoline (Afrin)    Phenylephrine (Sudafed)    I'm not sure    None of the above   Are you allergic to milk or to the proteins found in milk (for example, whey or casein)? A milk allergy is different from lactose intolerance. Select one.    No   Not selected:    Yes    I'm not sure   Have you ever had jaundice or liver problems as a result of taking azithromycin (Zithromax, Zmax)? Jaundice is a condition in which the skin and the whites of the eyes turn yellow. Select all that apply.    No, not that I know of   Not selected:    Yes, jaundice    Yes, liver problems   Do you need a doctor's note? A doctor's note confirms that you received care today and states when you can return to school or work. It does not contain information about your diagnosis or treatment plan. Your provider will make the final decision on whether to give you a doctor's note and for how long. Doctor's notes CANNOT be backdated. We can't provide medical leave paperwork through this type of visit. If more paperwork is needed to request time off, contact your primary care provider. Select one.    No   Not selected:    Today only (1 day)    Today and tomorrow (2 days)    3 days    7 days    10 days    14 days   Is there anything else you'd like to tell us about your symptoms?    Hello, a week ago I finished a 10 day dose of doxycycline. Symptoms improved but can't get rid of cough, sneezing and drainage. Cough has been so bad I hurt in my muscles on left side/back area. Have  been sick going on 3 weeks now.   ----------   Medical history   The following information was received from the EMR on May 01, 2022.   Allergies:    PENICILLINS   - Allergy Type: Medication   - Reaction: Rash   - Severity: Mild   - Clinical Status: Active   - Verification Status: Confirmed    IBUPROFEN   - Allergy Type: Medication   - Reaction: Unknown (See Comments)   - Severity: None   - Clinical Status: Active   - Verification Status: Confirmed    LISINOPRIL   - Allergy Type: Medication   - Reaction: Rash   - Severity: Mild   - Clinical Status: Active   - Verification Status: Confirmed    HYDROCHLOROTHIAZIDE   - Allergy Type: Medication   - Reaction: Other (See Comments)   - Severity: None   - Clinical Status: Active   - Verification Status: Confirmed    LOSARTAN   - Allergy Type: Medication   - Reaction: Other (See Comments)   - Severity: None   - Clinical Status: Active   - Verification Status: Confirmed   Medications:    MULTIVITAMIN WITH MINERALS   - Route: Oral   - Start Date: January 04, 2021   - End Date: None   - Status: Active    CALCIUM CARBONATE 600 MG PO TABS   - Route: Oral   - Start Date: January 04, 2021   - End Date: None   - Status: Active   Problem list:    Postoperative hypoxia   - Category: Problem List Item   - Health Status:   - Start Date: June 25, 2018   - End Date: August 14, 2019   - Status: Resolved    Nausea after anesthesia   - Category: Problem List Item   - Health Status:   - Start Date: June 25, 2018   - End Date: January 04, 2021   - Status: Resolved    Headache   - Category: Problem List Item   - Health Status:   - Start Date: June 25, 2018   - End Date: January 04, 2021   - Status: Resolved    Right rotator cuff tear   - Category: Problem List Item   - Health Status:   - Start Date: June 25, 2018   - End Date: January 04, 2021   - Status: Resolved    Essential hypertension   - Category: Problem List Item   - Health Status:   - Start Date: June 25, 2018   - End Date: January 04,  2021   - Status: Resolved    Atelectasis   - Category: Problem List Item   - Health Status:   - Start Date: June 26, 2018   - End Date: January 04, 2021   - Status: Resolved    Leukocytosis   - Category: Problem List Item   - Health Status:   - Start Date: June 26, 2018   - End Date: None   - Status: Active    Post-operative complication   - Category: Problem List Item   - Health Status:   - Start Date: June 27, 2018   - End Date: January 04, 2021   - Status: Resolved    Vitamin B12 deficiency   - Category: Problem List Item   - Health Status:   - Start Date: August 14, 2019   - End Date: None   - Status: Active    Vitamin D deficiency   - Category: Problem List Item   - Health Status:   - Start Date: August 14, 2019   - End Date: None   - Status: Active    Thyroid nodule   - Category: Problem List Item   - Health Status:   - Start Date: August 14, 2019   - End Date: January 04, 2021   - Status: Resolved    Night sweats   - Category: Problem List Item   - Health Status:   - Start Date: January 04, 2021   - End Date: None   - Status: Active    Class 1 obesity without serious comorbidity with body mass index (BMI) of 32.0 to 32.9 in adult   - Category: Problem List Item   - Health Status:   - Start Date: January 04, 2021   - End Date: None   - Status: Active